# Patient Record
Sex: FEMALE | Race: WHITE | NOT HISPANIC OR LATINO | Employment: UNEMPLOYED
[De-identification: names, ages, dates, MRNs, and addresses within clinical notes are randomized per-mention and may not be internally consistent; named-entity substitution may affect disease eponyms.]

---

## 2017-02-07 ENCOUNTER — RECORDS - HEALTHEAST (OUTPATIENT)
Dept: ADMINISTRATIVE | Facility: OTHER | Age: 42
End: 2017-02-07

## 2017-02-09 ENCOUNTER — RECORDS - HEALTHEAST (OUTPATIENT)
Dept: ADMINISTRATIVE | Facility: OTHER | Age: 42
End: 2017-02-09

## 2017-04-11 ENCOUNTER — RECORDS - HEALTHEAST (OUTPATIENT)
Dept: ADMINISTRATIVE | Facility: OTHER | Age: 42
End: 2017-04-11

## 2017-05-30 ENCOUNTER — AMBULATORY - RIVER FALLS (OUTPATIENT)
Dept: FAMILY MEDICINE | Facility: CLINIC | Age: 42
End: 2017-05-30
Payer: COMMERCIAL

## 2017-06-01 ENCOUNTER — RECORDS - HEALTHEAST (OUTPATIENT)
Dept: ADMINISTRATIVE | Facility: OTHER | Age: 42
End: 2017-06-01

## 2017-07-25 ENCOUNTER — RECORDS - HEALTHEAST (OUTPATIENT)
Dept: ADMINISTRATIVE | Facility: OTHER | Age: 42
End: 2017-07-25

## 2017-07-27 ENCOUNTER — RECORDS - HEALTHEAST (OUTPATIENT)
Dept: ADMINISTRATIVE | Facility: OTHER | Age: 42
End: 2017-07-27

## 2018-06-07 ENCOUNTER — COMMUNICATION - HEALTHEAST (OUTPATIENT)
Dept: SCHEDULING | Facility: CLINIC | Age: 43
End: 2018-06-07

## 2018-06-07 ENCOUNTER — OFFICE VISIT - HEALTHEAST (OUTPATIENT)
Dept: FAMILY MEDICINE | Facility: CLINIC | Age: 43
End: 2018-06-07

## 2018-06-07 DIAGNOSIS — G47.411 NARCOLEPSY WITH CATAPLEXY: ICD-10-CM

## 2018-06-07 DIAGNOSIS — G43.909 MIGRAINE: ICD-10-CM

## 2018-06-07 DIAGNOSIS — I10 ESSENTIAL HYPERTENSION: ICD-10-CM

## 2018-06-07 DIAGNOSIS — E87.6 HYPOKALEMIA: ICD-10-CM

## 2018-06-07 DIAGNOSIS — G47.10 HYPERSOMNIA: ICD-10-CM

## 2018-06-07 DIAGNOSIS — Z79.899 CONTROLLED SUBSTANCE AGREEMENT SIGNED: ICD-10-CM

## 2018-06-07 LAB
ANION GAP SERPL CALCULATED.3IONS-SCNC: 15 MMOL/L (ref 5–18)
BUN SERPL-MCNC: 13 MG/DL (ref 8–22)
CALCIUM SERPL-MCNC: 10.5 MG/DL (ref 8.5–10.5)
CHLORIDE BLD-SCNC: 100 MMOL/L (ref 98–107)
CO2 SERPL-SCNC: 21 MMOL/L (ref 22–31)
CREAT SERPL-MCNC: 1.01 MG/DL (ref 0.6–1.1)
GFR SERPL CREATININE-BSD FRML MDRD: 60 ML/MIN/1.73M2
GLUCOSE BLD-MCNC: 109 MG/DL (ref 70–125)
POTASSIUM BLD-SCNC: 3.5 MMOL/L (ref 3.5–5)
SODIUM SERPL-SCNC: 136 MMOL/L (ref 136–145)

## 2018-06-07 ASSESSMENT — MIFFLIN-ST. JEOR: SCORE: 1377.22

## 2018-06-15 ENCOUNTER — RECORDS - HEALTHEAST (OUTPATIENT)
Dept: ADMINISTRATIVE | Facility: OTHER | Age: 43
End: 2018-06-15

## 2018-06-15 ENCOUNTER — COMMUNICATION - HEALTHEAST (OUTPATIENT)
Dept: FAMILY MEDICINE | Facility: CLINIC | Age: 43
End: 2018-06-15

## 2018-07-06 ENCOUNTER — OFFICE VISIT - HEALTHEAST (OUTPATIENT)
Dept: SLEEP MEDICINE | Facility: CLINIC | Age: 43
End: 2018-07-06

## 2018-07-06 DIAGNOSIS — G47.10 HYPERSOMNIA: ICD-10-CM

## 2018-07-06 DIAGNOSIS — G47.411 NARCOLEPSY WITH CATAPLEXY: ICD-10-CM

## 2018-07-06 ASSESSMENT — MIFFLIN-ST. JEOR: SCORE: 1412.66

## 2018-07-17 ENCOUNTER — COMMUNICATION - HEALTHEAST (OUTPATIENT)
Dept: SLEEP MEDICINE | Facility: CLINIC | Age: 43
End: 2018-07-17

## 2018-07-25 ENCOUNTER — COMMUNICATION - HEALTHEAST (OUTPATIENT)
Dept: SLEEP MEDICINE | Facility: CLINIC | Age: 43
End: 2018-07-25

## 2018-07-25 DIAGNOSIS — G47.411 NARCOLEPSY WITH CATAPLEXY: ICD-10-CM

## 2018-07-26 ENCOUNTER — OFFICE VISIT - HEALTHEAST (OUTPATIENT)
Dept: FAMILY MEDICINE | Facility: CLINIC | Age: 43
End: 2018-07-26

## 2018-07-26 DIAGNOSIS — I10 ESSENTIAL HYPERTENSION: ICD-10-CM

## 2018-07-26 DIAGNOSIS — R51.9 FREQUENT HEADACHES: ICD-10-CM

## 2018-07-26 DIAGNOSIS — J20.9 ACUTE BRONCHITIS: ICD-10-CM

## 2018-07-26 DIAGNOSIS — G47.411 NARCOLEPSY WITH CATAPLEXY: ICD-10-CM

## 2018-07-26 LAB — FERRITIN SERPL-MCNC: 16 NG/ML (ref 10–130)

## 2018-07-26 ASSESSMENT — MIFFLIN-ST. JEOR: SCORE: 1405.29

## 2018-08-07 ENCOUNTER — COMMUNICATION - HEALTHEAST (OUTPATIENT)
Dept: SLEEP MEDICINE | Facility: CLINIC | Age: 43
End: 2018-08-07

## 2018-08-08 ENCOUNTER — COMMUNICATION - HEALTHEAST (OUTPATIENT)
Dept: SLEEP MEDICINE | Facility: CLINIC | Age: 43
End: 2018-08-08

## 2018-08-08 DIAGNOSIS — G47.10 HYPERSOMNIA: ICD-10-CM

## 2018-08-08 DIAGNOSIS — G47.411 NARCOLEPSY WITH CATAPLEXY: ICD-10-CM

## 2018-09-06 ENCOUNTER — COMMUNICATION - HEALTHEAST (OUTPATIENT)
Dept: FAMILY MEDICINE | Facility: CLINIC | Age: 43
End: 2018-09-06

## 2018-09-06 DIAGNOSIS — G47.411 NARCOLEPSY WITH CATAPLEXY: ICD-10-CM

## 2018-09-06 DIAGNOSIS — G47.10 HYPERSOMNIA: ICD-10-CM

## 2018-09-10 ENCOUNTER — COMMUNICATION - HEALTHEAST (OUTPATIENT)
Dept: SLEEP MEDICINE | Facility: CLINIC | Age: 43
End: 2018-09-10

## 2018-09-10 DIAGNOSIS — G47.411 NARCOLEPSY WITH CATAPLEXY: ICD-10-CM

## 2018-09-10 DIAGNOSIS — G47.10 HYPERSOMNIA: ICD-10-CM

## 2018-09-17 ENCOUNTER — COMMUNICATION - HEALTHEAST (OUTPATIENT)
Dept: SLEEP MEDICINE | Facility: CLINIC | Age: 43
End: 2018-09-17

## 2018-09-21 ENCOUNTER — COMMUNICATION - HEALTHEAST (OUTPATIENT)
Dept: SLEEP MEDICINE | Facility: CLINIC | Age: 43
End: 2018-09-21

## 2018-10-03 ENCOUNTER — OFFICE VISIT - HEALTHEAST (OUTPATIENT)
Dept: FAMILY MEDICINE | Facility: CLINIC | Age: 43
End: 2018-10-03

## 2018-10-03 DIAGNOSIS — J06.9 URI (UPPER RESPIRATORY INFECTION): ICD-10-CM

## 2018-10-03 ASSESSMENT — MIFFLIN-ST. JEOR: SCORE: 1346.89

## 2018-10-11 ENCOUNTER — AMBULATORY - HEALTHEAST (OUTPATIENT)
Dept: SLEEP MEDICINE | Facility: CLINIC | Age: 43
End: 2018-10-11

## 2018-11-15 ENCOUNTER — COMMUNICATION - HEALTHEAST (OUTPATIENT)
Dept: SLEEP MEDICINE | Facility: CLINIC | Age: 43
End: 2018-11-15

## 2018-11-18 ENCOUNTER — COMMUNICATION - HEALTHEAST (OUTPATIENT)
Dept: SLEEP MEDICINE | Facility: CLINIC | Age: 43
End: 2018-11-18

## 2018-11-18 DIAGNOSIS — G47.411 NARCOLEPSY WITH CATAPLEXY: ICD-10-CM

## 2018-11-18 DIAGNOSIS — G47.10 HYPERSOMNIA: ICD-10-CM

## 2018-11-21 ENCOUNTER — COMMUNICATION - HEALTHEAST (OUTPATIENT)
Dept: SLEEP MEDICINE | Facility: CLINIC | Age: 43
End: 2018-11-21

## 2018-12-13 ENCOUNTER — COMMUNICATION - HEALTHEAST (OUTPATIENT)
Dept: SLEEP MEDICINE | Facility: CLINIC | Age: 43
End: 2018-12-13

## 2018-12-13 DIAGNOSIS — G47.10 HYPERSOMNIA: ICD-10-CM

## 2018-12-13 DIAGNOSIS — G47.411 NARCOLEPSY WITH CATAPLEXY: ICD-10-CM

## 2018-12-18 ENCOUNTER — COMMUNICATION - HEALTHEAST (OUTPATIENT)
Dept: SLEEP MEDICINE | Facility: CLINIC | Age: 43
End: 2018-12-18

## 2018-12-18 DIAGNOSIS — G47.411 NARCOLEPSY WITH CATAPLEXY: ICD-10-CM

## 2019-01-17 ENCOUNTER — COMMUNICATION - HEALTHEAST (OUTPATIENT)
Dept: SLEEP MEDICINE | Facility: CLINIC | Age: 44
End: 2019-01-17

## 2019-01-17 DIAGNOSIS — G47.411 NARCOLEPSY WITH CATAPLEXY: ICD-10-CM

## 2019-01-17 DIAGNOSIS — G47.10 HYPERSOMNIA: ICD-10-CM

## 2019-01-21 ENCOUNTER — COMMUNICATION - HEALTHEAST (OUTPATIENT)
Dept: SLEEP MEDICINE | Facility: CLINIC | Age: 44
End: 2019-01-21

## 2019-01-23 ENCOUNTER — COMMUNICATION - HEALTHEAST (OUTPATIENT)
Dept: SLEEP MEDICINE | Facility: CLINIC | Age: 44
End: 2019-01-23

## 2019-01-25 ENCOUNTER — OFFICE VISIT - HEALTHEAST (OUTPATIENT)
Dept: SLEEP MEDICINE | Facility: CLINIC | Age: 44
End: 2019-01-25

## 2019-01-25 DIAGNOSIS — G47.419 NARCOLEPSY WITHOUT CATAPLEXY: ICD-10-CM

## 2019-01-25 ASSESSMENT — MIFFLIN-ST. JEOR: SCORE: 1418.33

## 2019-01-30 ENCOUNTER — COMMUNICATION - HEALTHEAST (OUTPATIENT)
Dept: SLEEP MEDICINE | Facility: CLINIC | Age: 44
End: 2019-01-30

## 2019-01-31 ENCOUNTER — COMMUNICATION - HEALTHEAST (OUTPATIENT)
Dept: SLEEP MEDICINE | Facility: CLINIC | Age: 44
End: 2019-01-31

## 2019-02-06 ENCOUNTER — COMMUNICATION - HEALTHEAST (OUTPATIENT)
Dept: SLEEP MEDICINE | Facility: CLINIC | Age: 44
End: 2019-02-06

## 2019-02-15 ENCOUNTER — OFFICE VISIT - HEALTHEAST (OUTPATIENT)
Dept: SLEEP MEDICINE | Facility: CLINIC | Age: 44
End: 2019-02-15

## 2019-02-15 DIAGNOSIS — G47.419 NARCOLEPSY WITHOUT CATAPLEXY: ICD-10-CM

## 2019-02-15 ASSESSMENT — MIFFLIN-ST. JEOR: SCORE: 1425.14

## 2019-02-19 ENCOUNTER — OFFICE VISIT (OUTPATIENT)
Dept: SLEEP MEDICINE | Facility: CLINIC | Age: 44
End: 2019-02-19
Payer: COMMERCIAL

## 2019-02-19 VITALS
BODY MASS INDEX: 26.53 KG/M2 | SYSTOLIC BLOOD PRESSURE: 127 MMHG | HEART RATE: 64 BPM | DIASTOLIC BLOOD PRESSURE: 84 MMHG | WEIGHT: 169 LBS | HEIGHT: 67 IN

## 2019-02-19 DIAGNOSIS — G47.411 NARCOLEPSY WITH CATAPLEXY: Primary | ICD-10-CM

## 2019-02-19 DIAGNOSIS — G47.411 NARCOLEPSY AND CATAPLEXY: Primary | ICD-10-CM

## 2019-02-19 LAB — HCG UR QL: NEGATIVE

## 2019-02-19 PROCEDURE — 81025 URINE PREGNANCY TEST: CPT | Performed by: INTERNAL MEDICINE

## 2019-02-19 RX ORDER — ACETAMINOPHEN 500 MG
500-1000 TABLET ORAL
COMMUNITY
Start: 2018-09-26 | End: 2020-04-07

## 2019-02-19 RX ORDER — LISINOPRIL AND HYDROCHLOROTHIAZIDE 20; 25 MG/1; MG/1
1 TABLET ORAL
COMMUNITY
Start: 2018-07-26 | End: 2020-11-06

## 2019-02-19 RX ORDER — DEXTROAMPHETAMINE SULFATE, DEXTROAMPHETAMINE SACCHARATE, AMPHETAMINE SULFATE AND AMPHETAMINE ASPARTATE 7.5; 7.5; 7.5; 7.5 MG/1; MG/1; MG/1; MG/1
CAPSULE, EXTENDED RELEASE ORAL
Refills: 0 | COMMUNITY
Start: 2019-01-28 | End: 2019-12-17

## 2019-02-19 RX ORDER — DEXTROAMPHETAMINE SACCHARATE, AMPHETAMINE ASPARTATE MONOHYDRATE, DEXTROAMPHETAMINE SULFATE AND AMPHETAMINE SULFATE 7.5; 7.5; 7.5; 7.5 MG/1; MG/1; MG/1; MG/1
30 CAPSULE, EXTENDED RELEASE ORAL
COMMUNITY
Start: 2016-11-16 | End: 2019-12-17

## 2019-02-19 RX ORDER — POTASSIUM CHLORIDE 1500 MG/1
20 TABLET, EXTENDED RELEASE ORAL
COMMUNITY
Start: 2018-12-21 | End: 2020-11-06

## 2019-02-19 RX ORDER — AMLODIPINE BESYLATE 5 MG/1
5 TABLET ORAL
COMMUNITY
Start: 2018-01-11 | End: 2020-04-07

## 2019-02-19 ASSESSMENT — MIFFLIN-ST. JEOR: SCORE: 1444.21

## 2019-02-19 NOTE — PROGRESS NOTES
"Patient of mine from [facility] [place]:  Dx with Narcolepsy with cataplexy in 2004. I was able to find a copy of her initial PSG and MSLT. Her initial PSG was completed on 6/14/04 and it showed a normal AHI (no BLAKE). Sh completed an MSLT the following day (6/15/04) and this showed a mean atency of 6.4 minutes and 4 SOREMs  Likely some component of sleep deprivation.    Visit to review entrance into Pitolisant study.    No charge visit.  Reviewed consent and signed.    /84   Pulse 64   Ht 1.702 m (5' 7\")   Wt 76.7 kg (169 lb)   BMI 26.47 kg/m    170.2 cm  76 kg     Urine preg qual (s/p tubal but required by study).  "

## 2019-02-19 NOTE — NURSING NOTE
"Chief Complaint   Patient presents with     Sleep Problem     Drug trial intake        Initial /84   Pulse 64   Ht 1.702 m (5' 7\")   Wt 76.7 kg (169 lb)   BMI 26.47 kg/m   Estimated body mass index is 26.47 kg/m  as calculated from the following:    Height as of this encounter: 1.702 m (5' 7\").    Weight as of this encounter: 76.7 kg (169 lb).    Medication Reconciliation: complete    Neck circumference: 12 inches / 31 centimeters.        "

## 2019-02-22 ENCOUNTER — COMMUNICATION - HEALTHEAST (OUTPATIENT)
Dept: SLEEP MEDICINE | Facility: CLINIC | Age: 44
End: 2019-02-22

## 2019-02-22 DIAGNOSIS — G47.10 HYPERSOMNIA: ICD-10-CM

## 2019-02-22 DIAGNOSIS — G47.419 NARCOLEPSY WITHOUT CATAPLEXY: ICD-10-CM

## 2019-02-22 DIAGNOSIS — G47.411 NARCOLEPSY WITH CATAPLEXY: ICD-10-CM

## 2019-03-21 ENCOUNTER — COMMUNICATION - HEALTHEAST (OUTPATIENT)
Dept: SLEEP MEDICINE | Facility: CLINIC | Age: 44
End: 2019-03-21

## 2019-03-21 DIAGNOSIS — G47.10 HYPERSOMNIA: ICD-10-CM

## 2019-03-21 DIAGNOSIS — G47.411 NARCOLEPSY WITH CATAPLEXY: ICD-10-CM

## 2019-03-25 ENCOUNTER — COMMUNICATION - HEALTHEAST (OUTPATIENT)
Dept: SLEEP MEDICINE | Facility: CLINIC | Age: 44
End: 2019-03-25

## 2019-03-25 DIAGNOSIS — G47.10 HYPERSOMNIA: ICD-10-CM

## 2019-03-25 DIAGNOSIS — G47.411 NARCOLEPSY WITH CATAPLEXY: ICD-10-CM

## 2019-04-22 ENCOUNTER — COMMUNICATION - HEALTHEAST (OUTPATIENT)
Dept: SLEEP MEDICINE | Facility: CLINIC | Age: 44
End: 2019-04-22

## 2019-04-22 DIAGNOSIS — G47.411 NARCOLEPSY WITH CATAPLEXY: ICD-10-CM

## 2019-04-22 DIAGNOSIS — G47.10 HYPERSOMNIA: ICD-10-CM

## 2019-04-23 ENCOUNTER — OFFICE VISIT (OUTPATIENT)
Dept: SLEEP MEDICINE | Facility: CLINIC | Age: 44
End: 2019-04-23
Payer: COMMERCIAL

## 2019-04-23 VITALS
OXYGEN SATURATION: 100 % | HEIGHT: 67 IN | HEART RATE: 84 BPM | DIASTOLIC BLOOD PRESSURE: 103 MMHG | BODY MASS INDEX: 27.62 KG/M2 | SYSTOLIC BLOOD PRESSURE: 148 MMHG | WEIGHT: 176 LBS

## 2019-04-23 DIAGNOSIS — G47.411 NARCOLEPSY AND CATAPLEXY: Primary | ICD-10-CM

## 2019-04-23 DIAGNOSIS — I10 ESSENTIAL HYPERTENSION: ICD-10-CM

## 2019-04-23 PROCEDURE — 99213 OFFICE O/P EST LOW 20 MIN: CPT | Performed by: INTERNAL MEDICINE

## 2019-04-23 ASSESSMENT — MIFFLIN-ST. JEOR: SCORE: 1475.96

## 2019-04-23 NOTE — PATIENT INSTRUCTIONS
Your BMI is Body mass index is 27.57 kg/m .  Weight management is a personal decision.  If you are interested in exploring weight loss strategies, the following discussion covers the approaches that may be successful. Body mass index (BMI) is one way to tell whether you are at a healthy weight, overweight, or obese. It measures your weight in relation to your height.  A BMI of 18.5 to 24.9 is in the healthy range. A person with a BMI of 25 to 29.9 is considered overweight, and someone with a BMI of 30 or greater is considered obese. More than two-thirds of American adults are considered overweight or obese.  Being overweight or obese increases the risk for further weight gain. Excess weight may lead to heart disease and diabetes.  Creating and following plans for healthy eating and physical activity may help you improve your health.  Weight control is part of healthy lifestyle and includes exercise, emotional health, and healthy eating habits. Careful eating habits lifelong are the mainstay of weight control. Though there are significant health benefits from weight loss, long-term weight loss with diet alone may be very difficult to achieve- studies show long-term success with dietary management in less than 10% of people. Attaining a healthy weight may be especially difficult to achieve in those with severe obesity. In some cases, medications, devices and surgical management might be considered.  What can you do?  If you are overweight or obese and are interested in methods for weight loss, you should discuss this with your provider.     Consider reducing daily calorie intake by 500 calories.     Keep a food journal.     Avoiding skipping meals, consider cutting portions instead.    Diet combined with exercise helps maintain muscle while optimizing fat loss. Strength training is particularly important for building and maintaining muscle mass. Exercise helps reduce stress, increase energy, and improves fitness.  Increasing exercise without diet control, however, may not burn enough calories to loose weight.       Start walking three days a week 10-20 minutes at a time    Work towards walking thirty minutes five days a week     Eventually, increase the speed of your walking for 1-2 minutes at time    In addition, we recommend that you review healthy lifestyles and methods for weight loss available through the National Institutes of Health patient information sites:  http://win.niddk.nih.gov/publications/index.htm    And look into health and wellness programs that may be available through your health insurance provider, employer, local community center, or keara club.    Weight management plan: Patient was referred to their PCP to discuss a diet and exercise plan.

## 2019-04-23 NOTE — NURSING NOTE
"Chief Complaint   Patient presents with     Sleep Problem     drug trial f/u       Initial BP (!) 148/103   Pulse 84   Ht 1.702 m (5' 7\")   Wt 79.8 kg (176 lb)   SpO2 100%   BMI 27.57 kg/m   Estimated body mass index is 27.57 kg/m  as calculated from the following:    Height as of this encounter: 1.702 m (5' 7\").    Weight as of this encounter: 79.8 kg (176 lb).    Medication Reconciliation: complete      "

## 2019-04-23 NOTE — PROGRESS NOTES
"Medication Follow-Up Visit:    Chief Complaint   Patient presents with     Sleep Problem     drug trial f/u       Maddi Fam comes in today for follow-up of narcolepsy type I     No specialty comments available.    Overall, the patient reports they are doing ok. Has good and bad days.  Has been on Pitolisant for about 8 weeks and doesn't feel she's noticed much of a difference.    Has been having slight elevations in blood pressure and ran out of amlodipine 3 days ago.    Appetite has been slightly higher and has gained 7 lbs.     Patient is not having medication side effects.   Lately has been working a little earlier - was starting at 6 AM and working 10 hour days, plus working 4 hours on Saturday.    Total score - [place]: 13 (4/23/2019  2:00 PM)  LAZARUS Total Score: 13    Past medical/surgical history, family history, social history, medications and allergies were reviewed.      Problem List:  Patient Active Problem List    Diagnosis Date Noted     Narcolepsy and cataplexy 02/19/2019     Priority: Medium     Dx with Narcolepsy with cataplexy in 2004. I was able to find a copy of her initial PSG and MSLT. Her initial PSG was completed on 6/14/04 and it showed a normal AHI (no BLAKE). Sh completed an MSLT the following day (6/15/04) and this showed a mean atency of 6.4 minutes and 4 SOREMs  Likely some component of sleep deprivation.          BP (!) 148/103   Pulse 84   Ht 1.702 m (5' 7\")   Wt 79.8 kg (176 lb)   SpO2 100%   BMI 27.57 kg/m      Impression/Plan:  1. Narcolepsy and cataplexy  Has not noticed much of a difference on the Pitolisant so far.  Concerned about weight gain but not sure that is related to the medication.  Will try for another 1 - 2 months then can consider switching to Xyrem after tapering off Pitolisant if she doesn't notice much of a difference.    2. Essential hypertension  Patient was counseled on the effects of untreated/sub-optimally treated hypertension.  She was told to discuss " findings with her PCP.   Maddi to follow up with Primary Care provider regarding elevated blood pressure.     Maddi Fam will follow up in about 2 month(s).     Fifteen minutes spent with patient, all of which were spent face-to-face counseling, consulting, coordinating plan of care.      CC:  No primary care provider on file.,

## 2019-05-14 ENCOUNTER — COMMUNICATION - HEALTHEAST (OUTPATIENT)
Dept: FAMILY MEDICINE | Facility: CLINIC | Age: 44
End: 2019-05-14

## 2019-05-20 ENCOUNTER — COMMUNICATION - HEALTHEAST (OUTPATIENT)
Dept: SLEEP MEDICINE | Facility: CLINIC | Age: 44
End: 2019-05-20

## 2019-05-20 DIAGNOSIS — G47.10 HYPERSOMNIA: ICD-10-CM

## 2019-05-20 DIAGNOSIS — G47.411 NARCOLEPSY WITH CATAPLEXY: ICD-10-CM

## 2019-06-20 ENCOUNTER — COMMUNICATION - HEALTHEAST (OUTPATIENT)
Dept: SLEEP MEDICINE | Facility: CLINIC | Age: 44
End: 2019-06-20

## 2019-06-20 DIAGNOSIS — G47.10 HYPERSOMNIA: ICD-10-CM

## 2019-06-20 DIAGNOSIS — G47.411 NARCOLEPSY WITH CATAPLEXY: ICD-10-CM

## 2019-07-19 ENCOUNTER — COMMUNICATION - HEALTHEAST (OUTPATIENT)
Dept: SLEEP MEDICINE | Facility: CLINIC | Age: 44
End: 2019-07-19

## 2019-07-19 DIAGNOSIS — G47.411 NARCOLEPSY WITH CATAPLEXY: ICD-10-CM

## 2019-07-19 DIAGNOSIS — G47.10 HYPERSOMNIA: ICD-10-CM

## 2019-08-20 ENCOUNTER — COMMUNICATION - HEALTHEAST (OUTPATIENT)
Dept: SLEEP MEDICINE | Facility: CLINIC | Age: 44
End: 2019-08-20

## 2019-08-20 DIAGNOSIS — G47.10 HYPERSOMNIA: ICD-10-CM

## 2019-08-20 DIAGNOSIS — G47.411 NARCOLEPSY WITH CATAPLEXY: ICD-10-CM

## 2019-09-17 ENCOUNTER — COMMUNICATION - HEALTHEAST (OUTPATIENT)
Dept: SLEEP MEDICINE | Facility: CLINIC | Age: 44
End: 2019-09-17

## 2019-09-17 DIAGNOSIS — G47.10 HYPERSOMNIA: ICD-10-CM

## 2019-09-17 DIAGNOSIS — G47.411 NARCOLEPSY WITH CATAPLEXY: ICD-10-CM

## 2019-10-17 ENCOUNTER — COMMUNICATION - HEALTHEAST (OUTPATIENT)
Dept: SLEEP MEDICINE | Facility: CLINIC | Age: 44
End: 2019-10-17

## 2019-10-29 ENCOUNTER — COMMUNICATION - HEALTHEAST (OUTPATIENT)
Dept: SLEEP MEDICINE | Facility: CLINIC | Age: 44
End: 2019-10-29

## 2019-12-20 DIAGNOSIS — G47.411 NARCOLEPSY AND CATAPLEXY: ICD-10-CM

## 2019-12-20 RX ORDER — DEXTROAMPHETAMINE SULFATE, DEXTROAMPHETAMINE SACCHARATE, AMPHETAMINE SULFATE AND AMPHETAMINE ASPARTATE 7.5; 7.5; 7.5; 7.5 MG/1; MG/1; MG/1; MG/1
30 CAPSULE, EXTENDED RELEASE ORAL 2 TIMES DAILY
Qty: 60 CAPSULE | Refills: 0 | Status: SHIPPED | OUTPATIENT
Start: 2019-12-20 | End: 2020-01-16

## 2019-12-20 NOTE — TELEPHONE ENCOUNTER
She states that only 1/2 the 30 days was filled if she takes med's twice daily. She is also requesting refills for both medications  til she is Seen by Dr. Melgoza 2/10/20.        ADDERALL XR 30 MG 24 hr capsule  Last Written Prescription Date:  12/17/19  Last Fill Quantity: 30,   # refills: 0  Last Office Visit: 6/23/19  Future Office visit: 2/10/20      Routing refill request to provider for review/approval because:  Drug not on the [facility], P or [facility] refill protocol or controlled substance

## 2020-01-13 ENCOUNTER — TELEPHONE (OUTPATIENT)
Dept: SLEEP MEDICINE | Facility: CLINIC | Age: 45
End: 2020-01-13

## 2020-01-16 DIAGNOSIS — G47.411 NARCOLEPSY AND CATAPLEXY: ICD-10-CM

## 2020-01-16 RX ORDER — DEXTROAMPHETAMINE SULFATE, DEXTROAMPHETAMINE SACCHARATE, AMPHETAMINE SULFATE AND AMPHETAMINE ASPARTATE 7.5; 7.5; 7.5; 7.5 MG/1; MG/1; MG/1; MG/1
30 CAPSULE, EXTENDED RELEASE ORAL 2 TIMES DAILY
Qty: 60 CAPSULE | Refills: 0 | Status: SHIPPED | OUTPATIENT
Start: 2020-01-19 | End: 2020-02-20

## 2020-01-16 RX ORDER — DEXTROAMPHETAMINE SACCHARATE, AMPHETAMINE ASPARTATE, DEXTROAMPHETAMINE SULFATE AND AMPHETAMINE SULFATE 7.5; 7.5; 7.5; 7.5 MG/1; MG/1; MG/1; MG/1
30 TABLET ORAL 2 TIMES DAILY
Qty: 60 TABLET | Refills: 0 | Status: SHIPPED | OUTPATIENT
Start: 2020-01-19 | End: 2020-02-20

## 2020-01-16 NOTE — TELEPHONE ENCOUNTER
amphetamine-dextroamphetamine (ADDERALL) 30 MG tablet      Last Written Prescription Date:  12/17/19  Last Fill Quantity: 60,   # refills: 0  Last Office Visit: 4/23/19  Future Office visit: appt with Svee   2/20/20    Routing refill request to provider for review/approval because:  Drug not on the [facility], [facility] or [facility] refill protocol or controlled substance        ADDERALL XR 30 MG 24 hr capsule  Last Written Prescription Date:  12/17/19  Last Fill Quantity: 60,   # refills: 0  Last Office Visit: 4/23/19  Future Office visit:   appt with Svee   2/20/20    Routing refill request to provider for review/approval because:  Drug not on the [facility], [facility] or [facility] refill protocol or controlled substance

## 2020-02-19 PROBLEM — I10 ESSENTIAL HYPERTENSION: Chronic | Status: ACTIVE | Noted: 2017-10-13

## 2020-02-19 PROBLEM — F17.200 TOBACCO USE DISORDER: Status: ACTIVE | Noted: 2020-02-19

## 2020-02-20 ENCOUNTER — TELEPHONE (OUTPATIENT)
Dept: SLEEP MEDICINE | Facility: CLINIC | Age: 45
End: 2020-02-20

## 2020-02-20 DIAGNOSIS — N20.0 KIDNEY STONE: ICD-10-CM

## 2020-02-20 RX ORDER — DEXTROAMPHETAMINE SACCHARATE, AMPHETAMINE ASPARTATE, DEXTROAMPHETAMINE SULFATE AND AMPHETAMINE SULFATE 7.5; 7.5; 7.5; 7.5 MG/1; MG/1; MG/1; MG/1
30 TABLET ORAL 2 TIMES DAILY
Qty: 60 TABLET | Refills: 0 | Status: SHIPPED | OUTPATIENT
Start: 2020-02-20 | End: 2020-04-07

## 2020-02-20 RX ORDER — DEXTROAMPHETAMINE SULFATE, DEXTROAMPHETAMINE SACCHARATE, AMPHETAMINE SULFATE AND AMPHETAMINE ASPARTATE 7.5; 7.5; 7.5; 7.5 MG/1; MG/1; MG/1; MG/1
30 CAPSULE, EXTENDED RELEASE ORAL 2 TIMES DAILY
Qty: 60 CAPSULE | Refills: 0 | Status: SHIPPED | OUTPATIENT
Start: 2020-02-20 | End: 2020-03-20

## 2020-02-20 NOTE — TELEPHONE ENCOUNTER
Patient is suffering with kidney stones and she got out of the hospital today and her doctor wants to try and pass it at home. She's in a lot of pain, we had to reschedule her appointment for 04/2020, but I will call her if something does open up sooner. She wanted Dr. Melgoza to know the situation and is asking and hoping he will fill the prescription. She is scheduled for a follow-up in 04/2020.      Pallavi Koenig

## 2020-03-19 DIAGNOSIS — G47.411 NARCOLEPSY AND CATAPLEXY: ICD-10-CM

## 2020-03-19 NOTE — TELEPHONE ENCOUNTER
Adderall XR 30 mg    Last Written Prescription Date:  2/20/20  Last Fill Quantity: 60,   # refills: 0  Last Office Visit: 4/23/19  Future Office visit: 4/8/2020      Routing refill request to provider for review/approval because:  Drug not on the [facility], [facility] or [facility] refill protocol or controlled substance

## 2020-03-19 NOTE — TELEPHONE ENCOUNTER
Reason for call:  Other   Patient called regarding (reason for call): prescription  Additional comments: Patient is requesting refill of Adderall prescriptions prior to appt on 4/8. Send to pharmacy on file.     Phone number to reach patient:  Home number on file [phone] (home)    Best Time:  Any time    Can we leave a detailed message on this number?  YES    Travel screening: Not Applicable

## 2020-03-20 RX ORDER — DEXTROAMPHETAMINE SULFATE, DEXTROAMPHETAMINE SACCHARATE, AMPHETAMINE SULFATE AND AMPHETAMINE ASPARTATE 7.5; 7.5; 7.5; 7.5 MG/1; MG/1; MG/1; MG/1
30 CAPSULE, EXTENDED RELEASE ORAL 2 TIMES DAILY
Qty: 60 CAPSULE | Refills: 0 | Status: SHIPPED | OUTPATIENT
Start: 2020-03-20 | End: 2020-04-08

## 2020-03-24 ENCOUNTER — COMMUNICATION - HEALTHEAST (OUTPATIENT)
Dept: SLEEP MEDICINE | Facility: CLINIC | Age: 45
End: 2020-03-24

## 2020-03-24 DIAGNOSIS — G47.10 HYPERSOMNIA: ICD-10-CM

## 2020-03-24 DIAGNOSIS — G47.411 NARCOLEPSY WITH CATAPLEXY: ICD-10-CM

## 2020-04-07 ENCOUNTER — TELEPHONE (OUTPATIENT)
Dept: SLEEP MEDICINE | Facility: CLINIC | Age: 45
End: 2020-04-07

## 2020-04-07 VITALS — HEIGHT: 67 IN | BODY MASS INDEX: 27.47 KG/M2 | WEIGHT: 175 LBS

## 2020-04-07 DIAGNOSIS — G47.411 NARCOLEPSY AND CATAPLEXY: ICD-10-CM

## 2020-04-07 RX ORDER — SUMATRIPTAN 100 MG/1
100 TABLET, FILM COATED ORAL
COMMUNITY
Start: 2016-05-25

## 2020-04-07 RX ORDER — ACETAMINOPHEN 500 MG
500-1000 TABLET ORAL
COMMUNITY
Start: 2018-09-26 | End: 2022-02-09

## 2020-04-07 RX ORDER — NAPROXEN 500 MG/1
TABLET ORAL
COMMUNITY
Start: 2020-02-24

## 2020-04-07 RX ORDER — POTASSIUM CHLORIDE 20MEQ/15ML
LIQUID (ML) ORAL
COMMUNITY
Start: 2020-02-10

## 2020-04-07 RX ORDER — CEPHALEXIN 250 MG
CAPSULE ORAL
COMMUNITY
End: 2022-02-09

## 2020-04-07 RX ORDER — METHOCARBAMOL 500 MG/1
TABLET, FILM COATED ORAL
COMMUNITY
Start: 2020-01-13 | End: 2020-11-06

## 2020-04-07 RX ORDER — LOSARTAN POTASSIUM 50 MG/1
50 TABLET ORAL
COMMUNITY
End: 2022-02-09 | Stop reason: DRUGHIGH

## 2020-04-07 ASSESSMENT — MIFFLIN-ST. JEOR: SCORE: 1466.42

## 2020-04-07 NOTE — NURSING NOTE
"Maddi Fam is a 45 year old female who is being evaluated via a billable telephone visit.      The patient has been notified of following:     \"This telephone visit will be conducted via a call between you and your physician/provider. We have found that certain health care needs can be provided without the need for a physical exam.  This service lets us provide the care you need with a short phone conversation.  If a prescription is necessary we can send it directly to your pharmacy.  If lab work is needed we can place an order for that and you can then stop by our lab to have the test done at a later time.    Telephone visits are billed at different rates depending on your insurance coverage. During this emergency period, for some insurers they may be billed the same as an in-person visit.  Please reach out to your insurance provider with any questions.    If during the course of the call the physician/provider feels a telephone visit is not appropriate, you will not be charged for this service.\"    Patient has given verbal consent for Telephone visit?  Yes    Juliet almazan      "

## 2020-04-07 NOTE — PROGRESS NOTES
Medication Follow-Up Visit:    Chief Complaint   Patient presents with     Medication Follow-up     Clinic visit changed to telephone visit due to covid 19      Maddi aFm comes in today for follow-up of narcolepsy     The patient was diagnosed with narcolepsy in 2004 at [facility]. Her initial PSG was completed in 2002 and did not show any sleep disordered breathing. She represented to Rajat Oswald at [facility] in 2004 and reported her sleepiness improved and the subsequently reoccurred. She has a second sleep study 6/14/04 (145#) that again showed a normal AHI (no BLAKE). She completed an MSLT the following day (6/15/04) and this showed a mean latency of 6.3 minutes and 4 SOREMs in 5 naps    There does not appear to be actigraphy or subjective history regarding total sleep time done in 2004.     Unclear if/where she was treated from [id number]     Patient was seen by Jerson Brown at [facility] 07/06/2018 to establish care for narcolepsy with cataplexy taking Adderall XR 30 mg PO two times a day XR 30 mg short acting two times a day (120 mg per day but on most days but on some days 90 mg). The patient described brief moments of cataplexy that occur in infrequent clusters. She described brief episodes during which she feels that she is going to fall or he head is weak.    Her care was transferred to Dr. Whitman 1/2019 where concerns regarding higher than normal doses of Adderall was expressed. She described her cataplexy as feeling like her medication is not working and her body is shutting down. Then laughter, or emotion or tiredness brings it on. Then she gets feelings of extreme sleepiness and sleep attack. It was felt that these episodes were sleep attacks NOT cataplexy.    Versus testing 1/29/2019  COMT genotype is associated with reduced therapeutic response to this drug: Amphetamine salts, dexmethylphendiate, dextroamphetamine, lisdexamfetamine, methylphenidate    In 2/2019 she entered into Pitolisant  study, but it did not seem effective and was associated with weight gain    No unusual Rx in [place] [facility] database 2/19/2020    Overall, the patient reports they are doing fair.  She gets sleep attack, especially in the morning    During work days bedtime is typically 930.. They are typically getting out of bed at 6.    On non-work days bedtime is typically 1030.  They are typically getting out of bed at 10-11 AM.      She is off work due to Covid-19    She goes back to sleep after getting up and is not functional.   She sometimes gets 'wide awake' when she goes to bed, sometimes this is all night. It has been intermittent for 1 year. She does have problems with an overactive mind.     She takes 30mg IR on awakening, sometimes 2  She takes 30mg XR at 9-10 am  At noon she takes either short or long acting  At 2 Pm she takes whichever one she did not take at noon      Patient is napping now that she is at work.   Patient is using caffeine. 3 sodas a day, last at 3pm  Patient is taking dug holidays with reduced doses, occasionally no doses.   Patient birth control is tubal ligation     Total score - [place]: 11 (4/7/2020  2:57 PM)  LAZARUS Total Score: 20      Past medical/surgical history, family history, social history, medications and allergies were reviewed.      Current Outpatient Medications   Medication Sig Dispense Refill     acetaminophen (TYLENOL) 500 MG tablet Take 500-1,000 mg by mouth       ADDERALL XR 30 MG 24 hr capsule Take 1 capsule (30 mg) by mouth 2 times daily 60 capsule 0     lisinopril-hydrochlorothiazide (PRINZIDE/ZESTORETIC) 20-25 MG tablet Take 1 tablet by mouth       losartan (COZAAR) 50 MG tablet Take 50 mg by mouth       methocarbamol (ROBAXIN) 500 MG tablet        Multiple Vitamins-Minerals (MULTIVITAMIN & MINERAL) LIQD        naproxen (NAPROSYN) 500 MG tablet        potassium chloride (KAYCIEL) 20 MEQ/15ML (10%) solution TK 15 ML PO D       potassium chloride ER (K-DUR/KLOR-CON M) 20 MEQ CR tablet  "Take 20 mEq by mouth       SUMAtriptan (IMITREX) 100 MG tablet Take 100 mg by mouth       topiramate (TOPAMAX) 25 MG tablet Take 50 mg by mouth daily            Problem List:  Patient Active Problem List    Diagnosis Date Noted     Kidney stone 02/20/2020     Priority: Medium     Tobacco use disorder 02/19/2020     Priority: Medium     Narcolepsy and cataplexy 02/19/2019     Priority: Medium     Dx with Narcolepsy with cataplexy in 2004. PSG 6/14/04 and it showed a normal AHI (no BLAKE).  MSLT 6/15/04  showed a mean latency of 6.4 minutes and 4 SOREMs         Essential hypertension 10/13/2017     Priority: Medium     Alcohol abuse 02/19/2014     Priority: Medium     Depression 02/19/2014     Priority: Medium     Migraine without aura 02/27/2008     Priority: Medium     Irritable bowel syndrome 01/24/2004     Priority: Low        Ht 1.702 m (5' 7\")   Wt 79.4 kg (175 lb)   BMI 27.41 kg/m      Impression/Plan:     Narcolepsy without cataplexy  - Refilled  3 months. She reports she never refilled the 3/20/20 XR because she needs it changed to generic. Her IR was filled 3/24/20 and will be due 4/23/20. I will give one Rx for 15 days of XR starting now to align refill dates, and cancel the non-generic Rx.   - Patient needs a face to face visit post-covid for urine drug screen and controlled substance agreement  - She has a refill from Altaf Parikh who does not work at our clinic  - She has irregularly staggered refills  - She wants Generic medications at this time      Sleep onset difficulties  Suspect some Psychophysiologic insomnia, complicated by delayed sleep phase syndrome   We discussed stimulus control   - Consider use of lightbox (10,000 lux, full spectrum or 450-500 nanometers) for 15-20 minutes every morning.  - Consider use of melatonin 1-3 mg, 3-5 hours prior to desired sleep time.     Maddi aFm will follow up in < 6 month(s).     Forty minutes spent with patient     "

## 2020-04-07 NOTE — PATIENT INSTRUCTIONS
Your BMI is Body mass index is 27.41 kg/m .  Weight management is a personal decision.  If you are interested in exploring weight loss strategies, the following discussion covers the approaches that may be successful. Body mass index (BMI) is one way to tell whether you are at a healthy weight, overweight, or obese. It measures your weight in relation to your height.  A BMI of 18.5 to 24.9 is in the healthy range. A person with a BMI of 25 to 29.9 is considered overweight, and someone with a BMI of 30 or greater is considered obese. More than two-thirds of American adults are considered overweight or obese.  Being overweight or obese increases the risk for further weight gain. Excess weight may lead to heart disease and diabetes.  Creating and following plans for healthy eating and physical activity may help you improve your health.  Weight control is part of healthy lifestyle and includes exercise, emotional health, and healthy eating habits. Careful eating habits lifelong are the mainstay of weight control. Though there are significant health benefits from weight loss, long-term weight loss with diet alone may be very difficult to achieve- studies show long-term success with dietary management in less than 10% of people. Attaining a healthy weight may be especially difficult to achieve in those with severe obesity. In some cases, medications, devices and surgical management might be considered.  What can you do?  If you are overweight or obese and are interested in methods for weight loss, you should discuss this with your provider.     Consider reducing daily calorie intake by 500 calories.     Keep a food journal.     Avoiding skipping meals, consider cutting portions instead.    Diet combined with exercise helps maintain muscle while optimizing fat loss. Strength training is particularly important for building and maintaining muscle mass. Exercise helps reduce stress, increase energy, and improves fitness.  Increasing exercise without diet control, however, may not burn enough calories to loose weight.       Start walking three days a week 10-20 minutes at a time    Work towards walking thirty minutes five days a week     Eventually, increase the speed of your walking for 1-2 minutes at time    In addition, we recommend that you review healthy lifestyles and methods for weight loss available through the National Institutes of Health patient information sites:  http://win.niddk.nih.gov/publications/index.htm    And look into health and wellness programs that may be available through your health insurance provider, employer, local community center, or keara club.    Weight management plan: Patient was referred to their PCP to discuss a diet and exercise plan.    - Consider use of lightbox (10,000 lux, full spectrum or 450-500 nanometers) for 15-20 minutes every morning.  - Consider use of melatonin 1-3 mg, 3-5 hours prior to desired sleep time.

## 2020-04-07 NOTE — TELEPHONE ENCOUNTER
PA Initiation    Medication: ADDERALL XR 30 MG 24 hr capsule   Insurance Company: [employer] - Phone [phone] Fax [phone]  Pharmacy Filling the Rx: [facility][id number] - [facility] [address] [facility] AT [address]  Filling Pharmacy Phone: [phone]  Filling Pharmacy Fax: [phone]  Start Date: 4/7/2020

## 2020-04-07 NOTE — TELEPHONE ENCOUNTER
Prior Authorization Retail Medication Request    Medication/Dose: Adderall XR       Pharmacy Information (if different than what is on RX)  Name:  [facility]   Phone:  [phone]       Plan info  [phone]    Pt ID# [id number]

## 2020-04-08 ENCOUNTER — TELEPHONE (OUTPATIENT)
Dept: SLEEP MEDICINE | Facility: CLINIC | Age: 45
End: 2020-04-08

## 2020-04-08 ENCOUNTER — VIRTUAL VISIT (OUTPATIENT)
Dept: SLEEP MEDICINE | Facility: CLINIC | Age: 45
End: 2020-04-08
Payer: COMMERCIAL

## 2020-04-08 DIAGNOSIS — F10.11 HISTORY OF ALCOHOL ABUSE: ICD-10-CM

## 2020-04-08 DIAGNOSIS — G47.419 NARCOLEPSY WITHOUT CATAPLEXY(347.00): ICD-10-CM

## 2020-04-08 PROBLEM — N20.0 KIDNEY STONE: Status: RESOLVED | Noted: 2020-02-20 | Resolved: 2020-04-08

## 2020-04-08 PROCEDURE — 99215 OFFICE O/P EST HI 40 MIN: CPT | Mod: TEL | Performed by: INTERNAL MEDICINE

## 2020-04-08 RX ORDER — DEXTROAMPHETAMINE SACCHARATE, AMPHETAMINE ASPARTATE MONOHYDRATE, DEXTROAMPHETAMINE SULFATE AND AMPHETAMINE SULFATE 7.5; 7.5; 7.5; 7.5 MG/1; MG/1; MG/1; MG/1
30 CAPSULE, EXTENDED RELEASE ORAL 2 TIMES DAILY
Qty: 60 CAPSULE | Refills: 0 | Status: SHIPPED | OUTPATIENT
Start: 2020-04-23 | End: 2020-04-08

## 2020-04-08 RX ORDER — DEXTROAMPHETAMINE SACCHARATE, AMPHETAMINE ASPARTATE, DEXTROAMPHETAMINE SULFATE AND AMPHETAMINE SULFATE 7.5; 7.5; 7.5; 7.5 MG/1; MG/1; MG/1; MG/1
30 TABLET ORAL 2 TIMES DAILY
Qty: 28 TABLET | Refills: 0 | Status: SHIPPED | OUTPATIENT
Start: 2020-04-08 | End: 2020-04-08

## 2020-04-08 RX ORDER — DEXTROAMPHETAMINE SACCHARATE, AMPHETAMINE ASPARTATE MONOHYDRATE, DEXTROAMPHETAMINE SULFATE AND AMPHETAMINE SULFATE 7.5; 7.5; 7.5; 7.5 MG/1; MG/1; MG/1; MG/1
30 CAPSULE, EXTENDED RELEASE ORAL 2 TIMES DAILY
Qty: 60 CAPSULE | Refills: 0 | Status: SHIPPED | OUTPATIENT
Start: 2020-05-23 | End: 2020-07-23

## 2020-04-08 RX ORDER — DEXTROAMPHETAMINE SACCHARATE, AMPHETAMINE ASPARTATE, DEXTROAMPHETAMINE SULFATE AND AMPHETAMINE SULFATE 7.5; 7.5; 7.5; 7.5 MG/1; MG/1; MG/1; MG/1
30 TABLET ORAL 2 TIMES DAILY
Qty: 60 TABLET | Refills: 0 | Status: SHIPPED | OUTPATIENT
Start: 2020-04-23 | End: 2020-04-08

## 2020-04-08 RX ORDER — DEXTROAMPHETAMINE SACCHARATE, AMPHETAMINE ASPARTATE MONOHYDRATE, DEXTROAMPHETAMINE SULFATE AND AMPHETAMINE SULFATE 7.5; 7.5; 7.5; 7.5 MG/1; MG/1; MG/1; MG/1
30 CAPSULE, EXTENDED RELEASE ORAL 2 TIMES DAILY
Qty: 28 CAPSULE | Refills: 0 | Status: SHIPPED | OUTPATIENT
Start: 2020-04-08 | End: 2020-04-08

## 2020-04-08 RX ORDER — DEXTROAMPHETAMINE SACCHARATE, AMPHETAMINE ASPARTATE, DEXTROAMPHETAMINE SULFATE AND AMPHETAMINE SULFATE 7.5; 7.5; 7.5; 7.5 MG/1; MG/1; MG/1; MG/1
30 TABLET ORAL 2 TIMES DAILY
Qty: 60 TABLET | Refills: 0 | Status: SHIPPED | OUTPATIENT
Start: 2020-05-23 | End: 2020-07-23

## 2020-04-08 SDOH — HEALTH STABILITY: MENTAL HEALTH: HOW OFTEN DO YOU HAVE A DRINK CONTAINING ALCOHOL?: 2-4 TIMES A MONTH

## 2020-04-08 NOTE — TELEPHONE ENCOUNTER
PRIOR AUTHORIZATION DENIED    Medication: ADDERALL XR 30 MG 24 hr capsule--DENIED    Denial Date: 4/8/2020    Denial Rational: Patient needs to try 2 preferred alternatives which are generic Adderall XR, methylphenidate, Ritalin LA and generic Metadate.    Appeal Information:

## 2020-04-08 NOTE — TELEPHONE ENCOUNTER
Prior Authorization Not Needed per Insurance    Medication: ADDERALL XR 30 MG 24 hr capsule--NO PA NEEDED  Insurance Company: [employer] - Phone [phone] Fax [phone]  Expected CoPay:      Pharmacy Filling the Rx: [id number] - [facility] [address] [facility] [address] [facility] [address]  Pharmacy Notified:  Yes  Patient Notified:  Yes    Generic doesn't need a prior authorization. Script isn't written for VETO brand name.

## 2020-04-21 ENCOUNTER — TELEPHONE (OUTPATIENT)
Dept: SLEEP MEDICINE | Facility: CLINIC | Age: 45
End: 2020-04-21

## 2020-04-21 ENCOUNTER — COMMUNICATION - HEALTHEAST (OUTPATIENT)
Dept: SLEEP MEDICINE | Facility: CLINIC | Age: 45
End: 2020-04-21

## 2020-04-21 DIAGNOSIS — G47.411 NARCOLEPSY WITH CATAPLEXY: ICD-10-CM

## 2020-04-21 DIAGNOSIS — G47.10 HYPERSOMNIA: ICD-10-CM

## 2020-04-21 NOTE — TELEPHONE ENCOUNTER
"Patient calling to request that Dr. Parikh call [facility] to approve and \"call in\" the early dispensing of the dextroamphetamine-amphetamine 30 mg Tab - [facility] stated to the patient that the Rx is set not to dispense before 4/23/20 - patient has already taken her last available dose and would like to  today     [facility][id number] - [facility] [place] - [address] [facility]     PH: [phone]     *Below is the Rx from [facility]     dextroamphetamine-amphetamine 30 mg Tab  60 tablet  0  3/24/2020   Yes    Sig - Route: Take 30 mg by mouth 2 (two) times a day. - Oral    Sent to pharmacy as: dextroamphetamine-amphetamine 30 mg tablet    Earliest Fill Date: 3/24/2020    E-Prescribing Status: Receipt confirmed by pharmacy (3/24/2020  3:01 PM CDT)    dextroamphetamine-amphetamine 30 mg Tab [[id number]]     Electronically signed by: Altaf Parikh DO on 03/24/20 1501     Patient is  amphetamine-dextroamphetamine (ADDERALL) 30 MG tablet and amphetamine-dextroamphetamine (ADDERALL XR) 30 MG 24 hr capsule  "

## 2020-04-22 NOTE — TELEPHONE ENCOUNTER
No I do not do early refills    She should be taking drug holidays, so the refills should actually last longer than 30 days

## 2020-06-24 ENCOUNTER — COMMUNICATION - HEALTHEAST (OUTPATIENT)
Dept: SLEEP MEDICINE | Facility: CLINIC | Age: 45
End: 2020-06-24

## 2020-06-24 DIAGNOSIS — G47.411 NARCOLEPSY WITH CATAPLEXY: ICD-10-CM

## 2020-06-24 DIAGNOSIS — G47.10 HYPERSOMNIA: ICD-10-CM

## 2020-07-21 ENCOUNTER — TELEPHONE (OUTPATIENT)
Dept: SLEEP MEDICINE | Facility: CLINIC | Age: 45
End: 2020-07-21

## 2020-07-21 DIAGNOSIS — G47.419 NARCOLEPSY WITHOUT CATAPLEXY(347.00): ICD-10-CM

## 2020-07-21 RX ORDER — DEXTROAMPHETAMINE SACCHARATE, AMPHETAMINE ASPARTATE, DEXTROAMPHETAMINE SULFATE AND AMPHETAMINE SULFATE 7.5; 7.5; 7.5; 7.5 MG/1; MG/1; MG/1; MG/1
30 TABLET ORAL 2 TIMES DAILY
Qty: 60 TABLET | Refills: 0 | Status: CANCELLED | OUTPATIENT
Start: 2020-07-21

## 2020-07-21 RX ORDER — DEXTROAMPHETAMINE SACCHARATE, AMPHETAMINE ASPARTATE MONOHYDRATE, DEXTROAMPHETAMINE SULFATE AND AMPHETAMINE SULFATE 7.5; 7.5; 7.5; 7.5 MG/1; MG/1; MG/1; MG/1
30 CAPSULE, EXTENDED RELEASE ORAL 2 TIMES DAILY
Qty: 60 CAPSULE | Refills: 0 | Status: CANCELLED | OUTPATIENT
Start: 2020-07-21

## 2020-07-21 NOTE — TELEPHONE ENCOUNTER
Per notes pt should be seen after covid for UDT and narcotic agreement. Please advise refill        amphetamine-dextroamphetamine (ADDERALL) 30 MG tablet   Last Written Prescription Date:  5/23/2020  Last Fill Quantity: 60,   # refills: 0  Last Office Visit: 4/8/2020  Future Office visit:  < 6 months     Routing refill request to provider for review/approval because:  Drug not on the G, P or  Health refill protocol or controlled substance]     amphetamine-dextroamphetamine (ADDERALL XR) 30 MG 24 hr      Last Written Prescription Date:  5/23/2020  Last Fill Quantity: 60,   # refills: 0  Last Office Visit: 4/8/2020  Future Office visit:   < 6months    Routing refill request to provider for review/approval because:  Drug not on the G, P or  Health refill protocol or controlled substance

## 2020-07-21 NOTE — TELEPHONE ENCOUNTER
Returned patients call per notes patient needs followup with Dr. Melgoza. Message to care coordinator also. Patient requesting refill of Adderall's.

## 2020-07-22 VITALS — BODY MASS INDEX: 28.56 KG/M2 | HEIGHT: 67 IN | WEIGHT: 182 LBS

## 2020-07-22 RX ORDER — ASPIRIN 325 MG/1
325 TABLET, FILM COATED ORAL DAILY
COMMUNITY
End: 2022-08-01

## 2020-07-22 RX ORDER — MULTIVITAMIN WITH IRON
1 TABLET ORAL 2 TIMES DAILY
COMMUNITY
End: 2020-11-06

## 2020-07-22 RX ORDER — DULOXETIN HYDROCHLORIDE 20 MG/1
20 CAPSULE, DELAYED RELEASE ORAL 2 TIMES DAILY
COMMUNITY
End: 2022-02-09 | Stop reason: DRUGHIGH

## 2020-07-22 ASSESSMENT — MIFFLIN-ST. JEOR: SCORE: 1490.24

## 2020-07-22 NOTE — PROGRESS NOTES
"Maddi Fam is a 45 year old female who is being evaluated via a billable video visit.      The patient has been notified of following:     \"This video visit will be conducted via a call between you and your physician/provider. We have found that certain health care needs can be provided without the need for an in-person physical exam.  This service lets us provide the care you need with a video conversation.  If a prescription is necessary we can send it directly to your pharmacy.  If lab work is needed we can place an order for that and you can then stop by our lab to have the test done at a later time.    Video visits are billed at different rates depending on your insurance coverage.  Please reach out to your insurance provider with any questions.    If during the course of the call the physician/provider feels a video visit is not appropriate, you will not be charged for this service.\"  `rbal consent for Video visit? Yes  How would you like to obtain your AVS? MyChart  If you are dropped from the video visit, the video invite should be resent to: Text to cell phone: [phone]  Will anyone else be joining your video visit? No        Video-Visit Details    Type of service:  Video Visit    Video Start Time: 10:30 AM  Video End Time: 10:44 AM    Originating Location (pt. Location): Home    Distant Location (provider location):  Home    Platform used for Video Visit: AmWell     -CHANGED TO [employer] DUE TO FAILURE TO CONNECT  Patient was on iphone    -Changed to telephone visit due to failure to connect with sarah Clark        Medication Follow-Up Visit:    Chief Complaint   Patient presents with     RECHECK     medication return visit         Maddi Fam comes in today for follow-up of narcolepsy     The patient was diagnosed with narcolepsy in 2004 at [facility]. Her initial PSG was completed in 2002 and did not show any sleep disordered breathing. She represented to Rajat Oswald at [facility] " in 2004 and reported her sleepiness improved and the subsequently reoccurred. She has a second sleep study 6/14/04 (145#) that again showed a normal AHI (no BLAKE). She completed an MSLT the following day (6/15/04) and this showed a mean latency of 6.3 minutes and 4 SOREMs in 5 naps    There does not appear to be actigraphy, subjective history regarding total sleep time, or urine drug screen done in 2004. Likewise medication list from that time is not available.    Unclear if/where she was treated from [id number]     Patient was seen by Jerson Brown at [facility] 07/06/2018 to establish care for narcolepsy with cataplexy taking Adderall XR 30 mg PO two times a day XR 30 mg short acting two times a day (120 mg per day but on most days but on some days 90 mg). The patient described brief moments of cataplexy that occur in infrequent clusters. She described brief episodes during which she feels that she is going to fall or he head is weak.    Her care was transferred to Dr. Whitman 1/2019 where concerns regarding higher than normal doses of Adderall was expressed. She described her cataplexy as feeling like her medication is not working and her body is shutting down. Then laughter, or emotion or tiredness brings it on. Then she gets feelings of extreme sleepiness and sleep attack. It was felt that these episodes were sleep attacks NOT cataplexy.    Genesight testing 1/29/2019  COMT genotype is associated with reduced therapeutic response to this drug: Amphetamine salts, dexmethylphendiate, dextroamphetamine, lisdexamfetamine, methylphenidate    In 2/2019 she entered into Pitolisant study, but it did not seem effective and was associated with weight gain    Care transferred to Jong Melgoza MD 4/8/2020    No unusual Rx in [facility] database 7/23/2020 except she had a RX from Dr. Parikh filled 6/24/20      Overall, the patient reports they are doing fine   Daily wakfulnesss is good.     Still having trouble falling asleep  "4-6/week. She is 'wide awake'. She denies difficulty turning her brain off.   She is also having some trouble falling back to sleep 4-6/week.    She takes melatonin 3mg at 6pm, avoiding caffeine in caffeine  She is using sunlamp 3-4/week for 30 minutes    Patient is not having medication side effects.     During work days bedtime is typically 11.  They are typically getting out of bed at 9.    On non-work days bedtime is typically 10-12. They are typically getting out of bed at 9.      She takes short acting in AM on awakening, then takes long-acting, repeats shirt acting 1 PM, last XR she takes most of the time before 3PM.       Patient is not  taking dug holidays  Patient birth control is tubal ligation     Total score - Porter: 15 (7/22/2020  1:36 PM)  LAZARUS Total Score: 16      Past medical/surgical history, family history, social history, medications and allergies were reviewed.      Problem List:  Patient Active Problem List    Diagnosis Date Noted     Tobacco use disorder 02/19/2020     Priority: Medium     Narcolepsy without cataplexy(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 PSG 6/14/04 and it showed a normal AHI (no BLAKE).  MSLT 6/15/04  showed a mean latency of 6.4 minutes and 4 SOREMs         Essential hypertension 10/13/2017     Priority: Medium     History of alcohol abuse 02/19/2014     Priority: Medium     S/p treatment about 2010       Migraine without aura 02/27/2008     Priority: Medium        Ht 1.689 m (5' 6.5\")   Wt 82.6 kg (182 lb)   BMI 28.94 kg/m      Impression/Plan:     Narcolepsy without cataplexy  - Doing well  - Continue current treatments.  - Encouraged drug holidays  - 3 months refills done     Sleep onset, maintenance difficulties   Suspect psychophysiologic insomnia. Sleep disturbance due to narcoplepsy may be suspected  We discussed stimulus control and sleep restriction  - Read the book Say Good Night To Insomnia    - Cognitive " behavioral training   - Consider trial of Darlyn gomez will follow up in about 3 month(s).     Twenty-five minutes spent with patient

## 2020-07-22 NOTE — NURSING NOTE
"Chief Complaint   Patient presents with     RECHECK     medication return visit       Initial Ht 1.689 m (5' 6.5\")   Wt 82.6 kg (182 lb)   BMI 28.94 kg/m   Estimated body mass index is 28.94 kg/m  as calculated from the following:    Height as of this encounter: 1.689 m (5' 6.5\").    Weight as of this encounter: 82.6 kg (182 lb).    Medication Reconciliation: complete        DME:no    "

## 2020-07-23 ENCOUNTER — VIRTUAL VISIT (OUTPATIENT)
Dept: SLEEP MEDICINE | Facility: CLINIC | Age: 45
End: 2020-07-23
Payer: COMMERCIAL

## 2020-07-23 DIAGNOSIS — F51.04 CHRONIC INSOMNIA: Primary | ICD-10-CM

## 2020-07-23 DIAGNOSIS — G47.419 NARCOLEPSY WITHOUT CATAPLEXY(347.00): ICD-10-CM

## 2020-07-23 PROCEDURE — 99214 OFFICE O/P EST MOD 30 MIN: CPT | Mod: GT | Performed by: INTERNAL MEDICINE

## 2020-07-23 RX ORDER — DEXTROAMPHETAMINE SACCHARATE, AMPHETAMINE ASPARTATE, DEXTROAMPHETAMINE SULFATE AND AMPHETAMINE SULFATE 7.5; 7.5; 7.5; 7.5 MG/1; MG/1; MG/1; MG/1
30 TABLET ORAL 2 TIMES DAILY
Qty: 60 TABLET | Refills: 0 | Status: SHIPPED | OUTPATIENT
Start: 2020-07-23 | End: 2020-10-20

## 2020-07-23 RX ORDER — DEXTROAMPHETAMINE SACCHARATE, AMPHETAMINE ASPARTATE, DEXTROAMPHETAMINE SULFATE AND AMPHETAMINE SULFATE 7.5; 7.5; 7.5; 7.5 MG/1; MG/1; MG/1; MG/1
30 TABLET ORAL 2 TIMES DAILY
Qty: 60 TABLET | Refills: 0 | Status: SHIPPED | OUTPATIENT
Start: 2020-09-22 | End: 2020-11-09

## 2020-07-23 RX ORDER — DEXTROAMPHETAMINE SACCHARATE, AMPHETAMINE ASPARTATE, DEXTROAMPHETAMINE SULFATE AND AMPHETAMINE SULFATE 7.5; 7.5; 7.5; 7.5 MG/1; MG/1; MG/1; MG/1
30 TABLET ORAL 2 TIMES DAILY
Qty: 60 TABLET | Refills: 0 | Status: SHIPPED | OUTPATIENT
Start: 2020-08-22 | End: 2020-11-09

## 2020-07-23 RX ORDER — DEXTROAMPHETAMINE SACCHARATE, AMPHETAMINE ASPARTATE MONOHYDRATE, DEXTROAMPHETAMINE SULFATE AND AMPHETAMINE SULFATE 7.5; 7.5; 7.5; 7.5 MG/1; MG/1; MG/1; MG/1
30 CAPSULE, EXTENDED RELEASE ORAL 2 TIMES DAILY
Qty: 60 CAPSULE | Refills: 0 | Status: SHIPPED | OUTPATIENT
Start: 2020-07-23 | End: 2020-07-23

## 2020-07-23 RX ORDER — DEXTROAMPHETAMINE SACCHARATE, AMPHETAMINE ASPARTATE MONOHYDRATE, DEXTROAMPHETAMINE SULFATE AND AMPHETAMINE SULFATE 7.5; 7.5; 7.5; 7.5 MG/1; MG/1; MG/1; MG/1
30 CAPSULE, EXTENDED RELEASE ORAL 2 TIMES DAILY
Qty: 60 CAPSULE | Refills: 0 | Status: SHIPPED | OUTPATIENT
Start: 2020-08-22 | End: 2020-10-20

## 2020-07-23 RX ORDER — DEXTROAMPHETAMINE SACCHARATE, AMPHETAMINE ASPARTATE MONOHYDRATE, DEXTROAMPHETAMINE SULFATE AND AMPHETAMINE SULFATE 7.5; 7.5; 7.5; 7.5 MG/1; MG/1; MG/1; MG/1
30 CAPSULE, EXTENDED RELEASE ORAL 2 TIMES DAILY
Qty: 60 CAPSULE | Refills: 0 | Status: SHIPPED | OUTPATIENT
Start: 2020-07-23 | End: 2020-11-09

## 2020-07-23 RX ORDER — DEXTROAMPHETAMINE SACCHARATE, AMPHETAMINE ASPARTATE MONOHYDRATE, DEXTROAMPHETAMINE SULFATE AND AMPHETAMINE SULFATE 7.5; 7.5; 7.5; 7.5 MG/1; MG/1; MG/1; MG/1
30 CAPSULE, EXTENDED RELEASE ORAL 2 TIMES DAILY
Qty: 60 CAPSULE | Refills: 0 | Status: SHIPPED | OUTPATIENT
Start: 2020-09-22 | End: 2020-11-09

## 2020-08-05 ENCOUNTER — TELEPHONE (OUTPATIENT)
Dept: SLEEP MEDICINE | Facility: CLINIC | Age: 45
End: 2020-08-05

## 2020-08-18 ENCOUNTER — TELEPHONE (OUTPATIENT)
Dept: SLEEP MEDICINE | Facility: CLINIC | Age: 45
End: 2020-08-18

## 2020-10-20 DIAGNOSIS — G47.419 NARCOLEPSY WITHOUT CATAPLEXY(347.00): ICD-10-CM

## 2020-10-20 DIAGNOSIS — F51.04 CHRONIC INSOMNIA: ICD-10-CM

## 2020-10-20 RX ORDER — DEXTROAMPHETAMINE SACCHARATE, AMPHETAMINE ASPARTATE, DEXTROAMPHETAMINE SULFATE AND AMPHETAMINE SULFATE 7.5; 7.5; 7.5; 7.5 MG/1; MG/1; MG/1; MG/1
30 TABLET ORAL 2 TIMES DAILY
Qty: 60 TABLET | Refills: 0 | Status: SHIPPED | OUTPATIENT
Start: 2020-10-22 | End: 2020-11-09

## 2020-10-20 RX ORDER — DEXTROAMPHETAMINE SACCHARATE, AMPHETAMINE ASPARTATE MONOHYDRATE, DEXTROAMPHETAMINE SULFATE AND AMPHETAMINE SULFATE 7.5; 7.5; 7.5; 7.5 MG/1; MG/1; MG/1; MG/1
30 CAPSULE, EXTENDED RELEASE ORAL 2 TIMES DAILY
Qty: 60 CAPSULE | Refills: 0 | Status: SHIPPED | OUTPATIENT
Start: 2020-10-22 | End: 2020-11-09

## 2020-10-20 NOTE — TELEPHONE ENCOUNTER
amphetamine-dextroamphetamine (ADDERALL XR) 30 MG 24 hr capsule      Last Written Prescription Date:  9/22/20  Last Fill Quantity: 60,   # refills: 0  Last Office Visit: 4/8/20  Future Office visit:  11/9/20     Routing refill request to provider for review/approval because:  Drug not on the [facility], [facility] or [facility] refill protocol or controlled substance      amphetamine-dextroamphetamine (ADDERALL) 30 MG tablet 60 tablet           Last Written Prescription Date:  9/22/20  Last Fill Quantity: 60,   # refills: 0  Last Office Visit: 4/8/20  Future Office visit:11/9/20       Routing refill request to provider for review/approval because:  Drug not on the [facility], [facility] or [facility] refill protocol or controlled substance

## 2020-10-20 NOTE — TELEPHONE ENCOUNTER
Reason for call: Per patient: I need a one month supply of amphetamine-dextroamphetamine (ADDERALL XR) 30 MG 24 hr capsule AND amphetamine-dextroamphetamine (ADDERALL) 30 MG tablet sen to the [facility] off [facility]    Phone number to reach patient:  Home number on file [phone]       Best Time:  Anytime    Can we leave a detailed message on this number?  YES    Travel screening: Not Applicable

## 2020-11-06 ASSESSMENT — MIFFLIN-ST. JEOR: SCORE: 1557.14

## 2020-11-06 NOTE — PROGRESS NOTES
"Maddi Fam is a 45 year old female who is being evaluated via a billable telephone visit.      The patient has been notified of following:     \"This telephone visit will be conducted via a call between you and your physician/provider. We have found that certain health care needs can be provided without the need for a physical exam.  This service lets us provide the care you need with a short phone conversation.  If a prescription is necessary we can send it directly to your pharmacy.  If lab work is needed we can place an order for that and you can then stop by our lab to have the test done at a later time.    Telephone visits are billed at different rates depending on your insurance coverage. During this emergency period, for some insurers they may be billed the same as an in-person visit.  Please reach out to your insurance provider with any questions.    If during the course of the call the physician/provider feels a telephone visit is not appropriate, you will not be charged for this service.\"    Patient has given verbal consent for Telephone visit?  Yes    What phone number would you like to be contacted at? [phone]    How would you like to obtain your AVS? MyChart      Medication Follow-Up Visit:    Chief Complaint   Patient presents with     RECHECK     MEDICATION F/U        Maddi Fam for follow-up of narcolepsy     The patient was diagnosed with narcolepsy in 2004 at [facility]. Her initial PSG was completed in 2002 and did not show any sleep disordered breathing. She represented to Rajat Oswald at [facility] in 2004 and reported her sleepiness improved and the subsequently reoccurred. She has a second sleep study 6/14/04 (145#) that again showed a normal AHI (no BLAKE). She completed an MSLT the following day (6/15/04) and this showed a mean latency of 6.3 minutes and 4 SOREMs in 5 naps     There does not appear to be actigraphy, subjective history regarding total sleep time, or urine drug screen " done in 2004. Likewise medication list from that time is not available.     Unclear if/where she was treated from [id number]     She says she was treated with Xyrem for a year in late 2000s, but has small children and  worked nights.     Patient was seen by Jerson Brown at [facility] 07/06/2018 to establish care for narcolepsy with cataplexy taking Adderall XR 30 mg PO two times a day XR 30 mg short acting two times a day (120 mg per day but on most days but on some days 90 mg). The patient described brief moments of cataplexy that occur in infrequent clusters. She described brief episodes during which she feels that she is going to fall or he head is weak.     Her care was transferred to Dr. Whitman 1/2019 where concerns regarding higher than normal doses of Adderall was expressed. She described her cataplexy as feeling like her medication is not working and her body is shutting down. Then laughter, or emotion or tiredness brings it on. Then she gets feelings of extreme sleepiness and sleep attack. It was felt that these episodes were sleep attacks NOT cataplexy.     Genesight testing 1/29/2019   COMT genotype is associated with reduced therapeutic response to this drug: Amphetamine salts, dexmethylphendiate, dextroamphetamine, lisdexamfetamine, methylphenidate     In 2/2019 she entered into Pitolisant study, but it did not seem effective and was associated with weight gain     Care transferred to Jong Melgoza MD 4/8/2020     No unusual Rx in [facility] database 11/9/2020 except she had a RX from Dr. Parikh filled 6/24/20      Overall, the patient reports the insomnia has improved. She has been reading Read the book Say Good Night To Insomnia.     She falls asleep okay, but awakens after 2-3 hours and tosses and turns for 3-4 hours. She is sleeping intermittently, but other times is wide awake (1-3/week).     She sometimes does not get up until 10 AM.     During work days bedtime is typically 11.     She gets  up at 8 and stay up 2-3 days a week.  They are typically getting out of bed at 10.      The patient is usually getting 9-10 hours of sleep per night.  Patient is not napping regularly. Son is distance learning.   Patient is taking dug holidays 1-2/week  Patient birth control is tubal ligation     Total score - Buchanan: 13 (11/6/2020 10:17 AM)        Past medical/surgical history, family history, social history, medications and allergies were reviewed.      Current Outpatient Medications   Medication Sig Dispense Refill     acetaminophen (TYLENOL) 500 MG tablet Take 500-1,000 mg by mouth       amphetamine-dextroamphetamine (ADDERALL XR) 30 MG 24 hr capsule Take 1 capsule (30 mg) by mouth 2 times daily 60 capsule 0     amphetamine-dextroamphetamine (ADDERALL) 30 MG tablet Take 1 tablet (30 mg) by mouth 2 times daily 60 tablet 0     aspirin - buffered (ASCRIPTIN) 325 MG TABS tablet Take 325 mg by mouth daily       DULoxetine (CYMBALTA) 20 MG capsule Take 20 mg by mouth 2 times daily       losartan (COZAAR) 50 MG tablet Take 50 mg by mouth       Multiple Vitamins-Minerals (MULTIVITAMIN & MINERAL) LIQD        naproxen (NAPROSYN) 500 MG tablet        potassium chloride (KAYCIEL) 20 MEQ/15ML (10%) solution TK 15 ML PO D       SUMAtriptan (IMITREX) 100 MG tablet Take 100 mg by mouth       topiramate (TOPAMAX) 25 MG tablet Take 50 mg by mouth daily          Problem List:  Patient Active Problem List    Diagnosis Date Noted     Tobacco use disorder 02/19/2020     Priority: Medium     Narcolepsy without cataplexy(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 PSG 6/14/04 and it showed a normal AHI (no BLAKE).  MSLT 6/15/04  showed a mean latency of 6.4 minutes and 4 SOREMs         Essential hypertension 10/13/2017     Priority: Medium     History of alcohol abuse 02/19/2014     Priority: Medium     S/p treatment about 2010       Migraine without aura 02/27/2008     Priority: Medium      "Chronic insomnia 07/23/2020     Priority: Low        Ht 1.702 m (5' 7\")   Wt 88.5 kg (195 lb)   BMI 30.54 kg/m      Impression/Plan:    Narcolepsy without cataplexy  Last Rx was for 10/22/20  - Continue current treatments.  - Encouraged drug holidays  - Given weight gain, snoring, sleep maintenance difficulties, long sleep times and the fact we are considering Miquelrem I recommended a Home Sleep ApneaTest to rule out interval development of obstructive sleep apnea   - 3 months refills done    Patients insurance does not cover an HST, changed to a Polysomnogram       Sleep maintenance difficulties   Suspect psychophysiologic insomnia. Sleep disturbance due to narcoplepsy may be suspected  Overall better, sleep onset difficulties have resolved. We reviewed stimulus control and sleep restriction  - Stressed addition of 1-2 naps during day and regular wake times. She takes melatonin 10mg at 6pm. Using lightbox in morning.     - Cognitive behavioral training   - Consider trial of Darlyn gomez will follow up in about 2 week(s) after study.     30 minutes spent with patient        "

## 2020-11-09 ENCOUNTER — VIRTUAL VISIT (OUTPATIENT)
Dept: SLEEP MEDICINE | Facility: CLINIC | Age: 45
End: 2020-11-09
Payer: COMMERCIAL

## 2020-11-09 VITALS — WEIGHT: 195 LBS | BODY MASS INDEX: 30.61 KG/M2 | HEIGHT: 67 IN

## 2020-11-09 DIAGNOSIS — R06.00 DYSPNEA AND RESPIRATORY ABNORMALITY: ICD-10-CM

## 2020-11-09 DIAGNOSIS — G47.419 NARCOLEPSY WITHOUT CATAPLEXY(347.00): ICD-10-CM

## 2020-11-09 DIAGNOSIS — E66.811 CLASS 1 OBESITY DUE TO EXCESS CALORIES WITHOUT SERIOUS COMORBIDITY WITH BODY MASS INDEX (BMI) OF 30.0 TO 30.9 IN ADULT: ICD-10-CM

## 2020-11-09 DIAGNOSIS — G47.9 DISTURBANCE IN SLEEP BEHAVIOR: ICD-10-CM

## 2020-11-09 DIAGNOSIS — I10 ESSENTIAL HYPERTENSION: ICD-10-CM

## 2020-11-09 DIAGNOSIS — F51.04 CHRONIC INSOMNIA: ICD-10-CM

## 2020-11-09 DIAGNOSIS — E66.09 CLASS 1 OBESITY DUE TO EXCESS CALORIES WITHOUT SERIOUS COMORBIDITY WITH BODY MASS INDEX (BMI) OF 30.0 TO 30.9 IN ADULT: ICD-10-CM

## 2020-11-09 DIAGNOSIS — R06.89 DYSPNEA AND RESPIRATORY ABNORMALITY: ICD-10-CM

## 2020-11-09 PROCEDURE — 99214 OFFICE O/P EST MOD 30 MIN: CPT | Mod: TEL | Performed by: INTERNAL MEDICINE

## 2020-11-09 RX ORDER — DEXTROAMPHETAMINE SACCHARATE, AMPHETAMINE ASPARTATE MONOHYDRATE, DEXTROAMPHETAMINE SULFATE AND AMPHETAMINE SULFATE 7.5; 7.5; 7.5; 7.5 MG/1; MG/1; MG/1; MG/1
30 CAPSULE, EXTENDED RELEASE ORAL 2 TIMES DAILY
Qty: 60 CAPSULE | Refills: 0 | Status: SHIPPED | OUTPATIENT
Start: 2020-11-21 | End: 2021-03-15

## 2020-11-09 RX ORDER — DEXTROAMPHETAMINE SACCHARATE, AMPHETAMINE ASPARTATE, DEXTROAMPHETAMINE SULFATE AND AMPHETAMINE SULFATE 7.5; 7.5; 7.5; 7.5 MG/1; MG/1; MG/1; MG/1
30 TABLET ORAL 2 TIMES DAILY
Qty: 60 TABLET | Refills: 0 | Status: SHIPPED | OUTPATIENT
Start: 2020-11-21 | End: 2021-05-03

## 2020-11-09 RX ORDER — DEXTROAMPHETAMINE SACCHARATE, AMPHETAMINE ASPARTATE MONOHYDRATE, DEXTROAMPHETAMINE SULFATE AND AMPHETAMINE SULFATE 7.5; 7.5; 7.5; 7.5 MG/1; MG/1; MG/1; MG/1
30 CAPSULE, EXTENDED RELEASE ORAL 2 TIMES DAILY
Qty: 60 CAPSULE | Refills: 0 | Status: SHIPPED | OUTPATIENT
Start: 2020-12-21 | End: 2021-05-03

## 2020-11-09 RX ORDER — DEXTROAMPHETAMINE SACCHARATE, AMPHETAMINE ASPARTATE MONOHYDRATE, DEXTROAMPHETAMINE SULFATE AND AMPHETAMINE SULFATE 7.5; 7.5; 7.5; 7.5 MG/1; MG/1; MG/1; MG/1
30 CAPSULE, EXTENDED RELEASE ORAL 2 TIMES DAILY
Qty: 60 CAPSULE | Refills: 0 | Status: SHIPPED | OUTPATIENT
Start: 2021-01-20 | End: 2021-05-03

## 2020-11-09 RX ORDER — DEXTROAMPHETAMINE SACCHARATE, AMPHETAMINE ASPARTATE, DEXTROAMPHETAMINE SULFATE AND AMPHETAMINE SULFATE 7.5; 7.5; 7.5; 7.5 MG/1; MG/1; MG/1; MG/1
30 TABLET ORAL 2 TIMES DAILY
Qty: 60 TABLET | Refills: 0 | Status: SHIPPED | OUTPATIENT
Start: 2020-12-21 | End: 2021-05-03

## 2020-11-09 RX ORDER — DEXTROAMPHETAMINE SACCHARATE, AMPHETAMINE ASPARTATE, DEXTROAMPHETAMINE SULFATE AND AMPHETAMINE SULFATE 7.5; 7.5; 7.5; 7.5 MG/1; MG/1; MG/1; MG/1
30 TABLET ORAL 2 TIMES DAILY
Qty: 60 TABLET | Refills: 0 | Status: SHIPPED | OUTPATIENT
Start: 2021-01-20 | End: 2021-03-15

## 2020-11-10 ENCOUNTER — TELEPHONE (OUTPATIENT)
Dept: SLEEP MEDICINE | Facility: CLINIC | Age: 45
End: 2020-11-10

## 2020-11-29 ENCOUNTER — HEALTH MAINTENANCE LETTER (OUTPATIENT)
Age: 45
End: 2020-11-29

## 2021-02-14 ENCOUNTER — HEALTH MAINTENANCE LETTER (OUTPATIENT)
Age: 46
End: 2021-02-14

## 2021-03-01 ENCOUNTER — TELEPHONE (OUTPATIENT)
Dept: SLEEP MEDICINE | Facility: CLINIC | Age: 46
End: 2021-03-01

## 2021-03-01 NOTE — TELEPHONE ENCOUNTER
Reason for Call:  Medication or medication refill:    Do you use a [facility]?  Name of the pharmacy and phone number for the current request:  [email or web address]    Name of the medication requested: adderall, adderall xr    Other request: patient also needs a sleep study schedule. Please follow up with patient.     Can we leave a detailed message on this number? YES    Phone number patient can be reached at: Cell number on file:    No relevant phone numbers on file.       Best Time: any    Call taken on 3/1/2021 at 9:42 AM by Taisha Cerna

## 2021-03-05 NOTE — TELEPHONE ENCOUNTER
Patient has been communicating with sleep staff in regards to the message, via [employer].    Zaina Mhor MA

## 2021-03-15 DIAGNOSIS — G47.419 NARCOLEPSY WITHOUT CATAPLEXY(347.00): ICD-10-CM

## 2021-03-15 RX ORDER — DEXTROAMPHETAMINE SACCHARATE, AMPHETAMINE ASPARTATE, DEXTROAMPHETAMINE SULFATE AND AMPHETAMINE SULFATE 7.5; 7.5; 7.5; 7.5 MG/1; MG/1; MG/1; MG/1
30 TABLET ORAL 2 TIMES DAILY
Qty: 60 TABLET | Refills: 0 | Status: SHIPPED | OUTPATIENT
Start: 2021-03-15 | End: 2021-05-04

## 2021-03-15 RX ORDER — DEXTROAMPHETAMINE SACCHARATE, AMPHETAMINE ASPARTATE MONOHYDRATE, DEXTROAMPHETAMINE SULFATE AND AMPHETAMINE SULFATE 7.5; 7.5; 7.5; 7.5 MG/1; MG/1; MG/1; MG/1
30 CAPSULE, EXTENDED RELEASE ORAL 2 TIMES DAILY
Qty: 60 CAPSULE | Refills: 0 | Status: SHIPPED | OUTPATIENT
Start: 2021-03-15 | End: 2021-05-04

## 2021-03-15 NOTE — TELEPHONE ENCOUNTER
amphetamine-dextroamphetamine (ADDERALL XR) 30 MG 24 hr capsule        Last Written Prescription Date:  1/20/21  Last Fill Quantity: 60,   # refills: 0  Last Office Visit: 11/9/21  Future Office visit:   4/12/21    Routing refill request to provider for review/approval because:  Drug not on the [facility], [facility] or  [facility] refill protocol or controlled substance          amphetamine-dextroamphetamine (ADDERALL) 30 MG tablet  Last Written Prescription Date:  1/20/21  Last Fill Quantity: 60,   # refills: 0  Last Office Visit: 11/9/21  Future Office visit:   4/12/21    Routing refill request to provider for review/approval because:  Drug not on the [facility] or [facility] refill protocol or controlled substance

## 2021-03-25 ENCOUNTER — THERAPY VISIT (OUTPATIENT)
Dept: SLEEP MEDICINE | Facility: CLINIC | Age: 46
End: 2021-03-25
Payer: COMMERCIAL

## 2021-03-25 DIAGNOSIS — G47.9 DISTURBANCE IN SLEEP BEHAVIOR: ICD-10-CM

## 2021-03-25 DIAGNOSIS — R06.89 DYSPNEA AND RESPIRATORY ABNORMALITY: ICD-10-CM

## 2021-03-25 DIAGNOSIS — E66.09 CLASS 1 OBESITY DUE TO EXCESS CALORIES WITHOUT SERIOUS COMORBIDITY WITH BODY MASS INDEX (BMI) OF 30.0 TO 30.9 IN ADULT: ICD-10-CM

## 2021-03-25 DIAGNOSIS — E66.811 CLASS 1 OBESITY DUE TO EXCESS CALORIES WITHOUT SERIOUS COMORBIDITY WITH BODY MASS INDEX (BMI) OF 30.0 TO 30.9 IN ADULT: ICD-10-CM

## 2021-03-25 DIAGNOSIS — I10 ESSENTIAL HYPERTENSION: ICD-10-CM

## 2021-03-25 DIAGNOSIS — F51.04 CHRONIC INSOMNIA: ICD-10-CM

## 2021-03-25 DIAGNOSIS — R06.00 DYSPNEA AND RESPIRATORY ABNORMALITY: ICD-10-CM

## 2021-03-25 DIAGNOSIS — G47.419 NARCOLEPSY WITHOUT CATAPLEXY(347.00): ICD-10-CM

## 2021-03-25 PROCEDURE — 95810 POLYSOM 6/> YRS 4/> PARAM: CPT | Performed by: INTERNAL MEDICINE

## 2021-03-30 NOTE — PROCEDURES
" SLEEP STUDY INTERPRETATION  DIAGNOSTIC POLYSOMNOGRAPHY REPORT      Patient: JOSE YARBROUGH  YOB: 1975  Study Date: 3/25/2021  MRN: [id number]  Referring Provider: -  Ordering Provider: Jong Melgoza MD    Indications for Polysomnography: The patient is a 46 year old Female who is 5' 7\" and weighs 195.0 lbs. Her BMI is 30.6, [place] sleepiness scale 13 and neck circumference is 41 cm. A diagnostic polysomnogram was performed to evaluate for narcolepsy with recent weight gain, snoring, sleep maintenance difficulties, long sleep times    Polysomnogram Data: A full night polysomnogram recorded the standard physiologic parameters including EEG, EOG, EMG, ECG, nasal and oral airflow. Respiratory parameters of chest and abdominal movements were recorded with respiratory inductance plethysmography. Oxygen saturation was recorded by pulse oximetry. Hypopnea scoring rule used: 1B 4%.    Sleep Architecture: Only 20 minutes of sleep recorded  The total recording time of the polysomnogram was 516.1 minutes. The total sleep time was 20.0 minutes. Sleep latency was increased at 482.9 minutes without the use of a sleep aid. REM latency was n/a. Arousal index was increased at 66.0 arousals per hour. Sleep efficiency was poor at 3.9%. Wake after sleep onset was 1.5 minutes. The patient spent 22.5% of total sleep time in Stage N1, 77.5% in Stage N2, 0.0% in Stage N3, and 0.0% in REM. Time in REM supine was 0 minutes.    Respiration:     Events ? The polysomnogram revealed a presence of 7 obstructive, 0 central, and 0 mixed apneas resulting in an apnea index of 21.0 events per hour. There were 0 obstructive hypopneas and 0 central hypopneas resulting in an obstructive hypopnea index of 0 and central hypopnea index of 0 events per hour. The combined (supine) apnea/hypopnea index was 21.0 events per hour (central apnea/hypopnea index was 0 events per hour). The REM AHI was n/a. The supine AHI was 21.0 events per hour. The " RERA index was 57.0 events per hour.  The RDI was 78.0 events per hour.    Snoring - was reported as moderate    Respiratory rate and pattern - was notable for normal respiratory rate and pattern.    Sustained Sleep Associated Hypoventilation - Transcutaneous carbon dioxide monitoring was not used, however significant hypoventilation was not suggested by oximetry    Sleep Associated Hypoxemia - (Greater than 5 minutes O2 sat at or below 88%) was not present. Baseline oxygen saturation was 97.6%. Lowest oxygen saturation was 91.2%. Time spent less than or equal to 88% was 0 minutes. Time spent less than or equal to 89% was 0 minutes.    Movement Activity:     Periodic Limb Activity - There were 0 PLMs during the entire study.     REM EMG Activity - REM was not achieved    Nocturnal Behavior - Abnormal sleep related behaviors were not noted     Bruxism - None apparent.    Cardiac Summary:   The average pulse rate was 81.4 bpm. The minimum pulse rate was 53.2 bpm while the maximum pulse rate was 110.0 bpm.  Arrhythmias were not noted.      Assessment:     Non-diagnostic study due to lack of sleep    Significant sleep disordered breathing was suggested during brief 20 minute episode of supine sleep.     Recommendations:    Recommend repeat polysomnography with use of a sleep aide.     Suggest optimizing sleep schedule     Diagnostic Codes:   Hypersomnia, Unspecified F51.11       _____________________________________   Electronically Signed By: Jong Melgoza MD 3/30/21           Range(%) Time in range (min)   0.0 - 89.0 -   0.0 - 88.0 -         Stage Min(mm Hg) Max(mm Hg)   Wake - -   NREM(1+2+3) - -   REM - -       Range(mmHg) Time in range (min)   55.0 - 100.0 -   Excluded data <20.0 & >65.0 516.5

## 2021-03-30 NOTE — PROGRESS NOTES
Called patient   Minimal sleep on sleep study, but sleep disordered breathing is suggested  Recommend repeat Polysomnogram with ambien, modest pre-study sleep deprivation (limit time in bed to 6 or 7 hours for 3 nights prior to study)  Orders placed

## 2021-04-01 LAB — SLPCOMP: NORMAL

## 2021-04-01 RX ORDER — ZOLPIDEM TARTRATE 5 MG/1
TABLET ORAL
Qty: 1 TABLET | Refills: 0 | Status: SHIPPED | OUTPATIENT
Start: 2021-04-01 | End: 2022-02-09

## 2021-04-27 ENCOUNTER — THERAPY VISIT (OUTPATIENT)
Dept: SLEEP MEDICINE | Facility: CLINIC | Age: 46
End: 2021-04-27
Payer: COMMERCIAL

## 2021-04-27 DIAGNOSIS — G47.9 DISTURBANCE IN SLEEP BEHAVIOR: ICD-10-CM

## 2021-04-27 DIAGNOSIS — I10 ESSENTIAL HYPERTENSION: ICD-10-CM

## 2021-04-27 DIAGNOSIS — E66.811 CLASS 1 OBESITY DUE TO EXCESS CALORIES WITHOUT SERIOUS COMORBIDITY WITH BODY MASS INDEX (BMI) OF 30.0 TO 30.9 IN ADULT: ICD-10-CM

## 2021-04-27 DIAGNOSIS — R06.00 DYSPNEA AND RESPIRATORY ABNORMALITY: ICD-10-CM

## 2021-04-27 DIAGNOSIS — R06.89 DYSPNEA AND RESPIRATORY ABNORMALITY: ICD-10-CM

## 2021-04-27 DIAGNOSIS — F51.04 CHRONIC INSOMNIA: ICD-10-CM

## 2021-04-27 DIAGNOSIS — E66.09 CLASS 1 OBESITY DUE TO EXCESS CALORIES WITHOUT SERIOUS COMORBIDITY WITH BODY MASS INDEX (BMI) OF 30.0 TO 30.9 IN ADULT: ICD-10-CM

## 2021-04-27 DIAGNOSIS — G47.419 NARCOLEPSY WITHOUT CATAPLEXY(347.00): ICD-10-CM

## 2021-04-27 PROCEDURE — 95810 POLYSOM 6/> YRS 4/> PARAM: CPT | Performed by: INTERNAL MEDICINE

## 2021-04-30 PROBLEM — G47.33 OSA (OBSTRUCTIVE SLEEP APNEA): Chronic | Status: ACTIVE | Noted: 2021-04-30

## 2021-04-30 NOTE — PROCEDURES
" SLEEP STUDY INTERPRETATION  DIAGNOSTIC POLYSOMNOGRAPHY REPORT      Patient: JOSE YARBROUGH  YOB: 1975  Study Date: 4/27/2021  MRN: [id number]  Referring Provider: Astrid Chow MD  Ordering Provider: Jong Melgoza MD    Indications for Polysomnography: The patient is a 46 year old Female who is 5' 7\" and weighs 195.0 lbs. Her BMI is 30.6, [place] sleepiness scale 15 and neck circumference is 35 cm. A diagnostic polysomnogram was performed to evaluate for history of Narcolepsy without cataplexy with weight gain, snoring, sleep maintenance difficulties, long sleep times, Contemplating sodium oxybate therapy.     Polysomnogram Data: A full night polysomnogram recorded the standard physiologic parameters including EEG, EOG, EMG, ECG, nasal and oral airflow. Respiratory parameters of chest and abdominal movements were recorded with respiratory inductance plethysmography. Oxygen saturation was recorded by pulse oximetry. Hypopnea scoring rule used: 1A 3%.    Sleep Architecture: Sleep was disrupted by arousals. Reduced REM sleep was noted. Dense spindle activity and alpha intrusions were noted.   The recording time of the polysomnogram was 560.4 minutes. The sleep time was 340.0 minutes. Sleep latency was increased at 22.0 minutes with the use of a sleep aid (zolpidem). REM latency was 476.0 minutes. Arousal index was increased at 41.6 arousals per hour. Sleep efficiency was decreased at 60.7%. Wake after sleep onset was 190.5 minutes. The patient spent 17.1% of total sleep time in Stage N1, 67.6% in Stage N2, 10.0% in Stage N3, and 5.3% in REM. Time in REM supine was 0 minutes.    Respiration: scored using 3% rules    Events ? The polysomnogram revealed a presence of 4 obstructive, 3 central, and 0 mixed apneas resulting in an apnea index of 1.2 events per hour. There were 66 obstructive hypopneas and 0 central hypopneas resulting in an obstructive hypopnea index of 11.6 and central hypopnea index of 0 " events per hour. The combined apnea/hypopnea index was 12.9 events per hour (central apnea/hypopnea index was 0.5 events per hour). The REM AHI was 6.7 events per hour. The supine AHI was 12.0 events per hour. The RERA index was 5.6 events per hour.  The RDI was 18.5 events per hour.        Snoring - was reported as moderate to loud.    Respiratory rate and pattern - was notable for normal respiratory rate and pattern.    Sustained Sleep Associated Hypoventilation - Transcutaneous carbon dioxide monitoring was not used, however significant hypoventilation was not suggested by oximetry    Sleep Associated Hypoxemia - (Greater than 5 minutes O2 sat at or below 88%) was not present. Baseline oxygen saturation was 96.7%. Lowest oxygen saturation was 88.0%. Time spent less than or equal to 88% was 0 minutes. Time spent less than or equal to 89% was 0 minutes.    Movement Activity:     Periodic Limb Activity - There were 4 PLMs during the entire study. The PLM index was 0.7 movements per hour. The PLM Arousal Index was 0.4 per hour.    REM EMG Activity - Excessive transient/sustained muscle activity was not present.    Nocturnal Behavior - Abnormal sleep related behaviors were not noted     Bruxism - None apparent.      Cardiac Summary:   The average pulse rate was 71.6 bpm. The minimum pulse rate was 59.4 bpm while the maximum pulse rate was 88.2 bpm.  Arrhythmias were not noted.        Assessment:     Mild obstructive sleep apnea    Recommendations:    Assess for benzodiazepine use history     Based on the presence of mild/moderate obstructive sleep apnea and excessive daytime sleepiness, treatment could be empirically initiated with Auto?titrating PAP therapy with a range of 5 to 15 cmH2O. Recommend clinical follow up with sleep management team.    Patient may be a candidate for dental appliance through referral to Sleep Dentistry for the treatment of obstructive sleep apnea and/or socially disruptive  snoring.    Advice regarding the risks of drowsy driving.    Suggest optimizing sleep schedule and avoiding sleep deprivation.    Weight management (if BMI > 30).    Pharmacologic therapy should be used for management of restless legs syndrome only if present and clinically indicated and not based on the presence of periodic limb movements alone.    Diagnostic Codes:   Obstructive Sleep Apnea G47.33      _____________________________________   Electronically Signed By: Jong Melgoza MD 4/30/21           Range(%) Time in range (min)   0.0 - 89.0 0.0   0.0 - 88.0 -         Stage Min(mm Hg) Max(mm Hg)   Wake - -   NREM(1+2+3) - -   REM - -       Range(mmHg) Time in range (min)   55.0 - 100.0 -   Excluded data <20.0 & >65.0 560.5

## 2021-05-03 LAB — SLPCOMP: NORMAL

## 2021-05-03 NOTE — PATIENT INSTRUCTIONS

## 2021-05-03 NOTE — PROGRESS NOTES
Ahmet is a 46 year old who is being evaluated via a billable video visit.      How would you like to obtain your AVS? MyChart  If the video visit is dropped, the invitation should be resent by: Text to cell phone: [phone]  Will anyone else be joining your video visit? No    Mera Ann MA on 5/3/2021 at 2:03 PM    Video Start Time: 2:29 PM  Video-Visit Details    Type of service:  Video Visit    Video End Time:2:55 PM    Originating Location (pt. Location): Home    Distant Location (provider location):  [facility] SLEEP [facility]     Platform used for Video Visit: [facility]       Sleep Study Follow-Up Visit:    Date on this visit: 5/4/2021    Maddi Fam comes in today for follow-up of her sleep study done on 4/27/2021 at the [facility].  A diagnostic polysomnogram was performed to evaluate for history of Narcolepsy without cataplexy with weight gain, snoring, sleep maintenance difficulties, long sleep times, Contemplating sodium oxybate therapy.     Polysomnogram interpreted by Dr Melgoza:   Polysomnogram Data: A full night polysomnogram recorded the standard physiologic parameters including EEG, EOG, EMG, ECG, nasal and oral airflow. Respiratory parameters of chest and abdominal movements were recorded with respiratory inductance plethysmography. Oxygen saturation was recorded by pulse oximetry. Hypopnea scoring rule used: 1A 3%.     Sleep Architecture: Sleep was disrupted by arousals. Reduced REM sleep was noted. Dense spindle activity and alpha intrusions were noted.   The recording time of the polysomnogram was 560.4 minutes. The sleep time was 340.0 minutes. Sleep latency was increased at 22.0 minutes with the use of a sleep aid (zolpidem). REM latency was 476.0 minutes. Arousal index was increased at 41.6 arousals per hour. Sleep efficiency was decreased at 60.7%. Wake after sleep onset was 190.5 minutes. The patient spent 17.1% of total sleep time in  Stage N1, 67.6% in Stage N2, 10.0% in Stage N3, and 5.3% in REM. Time in REM supine was 0 minutes.     Respiration: scored using 3% rules    Events ? The polysomnogram revealed a presence of 4 obstructive, 3 central, and 0 mixed apneas resulting in an apnea index of 1.2 events per hour. There were 66 obstructive hypopneas and 0 central hypopneas resulting in an obstructive hypopnea index of 11.6 and central hypopnea index of 0 events per hour. The combined apnea/hypopnea index was 12.9 events per hour (central apnea/hypopnea index was 0.5 events per hour). The REM AHI was 6.7 events per hour. The supine AHI was 12.0 events per hour. The RERA index was 5.6 events per hour.  The RDI was 18.5 events per hour.         Snoring - was reported as moderate to loud.    Respiratory rate and pattern - was notable for normal respiratory rate and pattern.    Sustained Sleep Associated Hypoventilation - Transcutaneous carbon dioxide monitoring was not used, however significant hypoventilation was not suggested by oximetry    Sleep Associated Hypoxemia - (Greater than 5 minutes O2 sat at or below 88%) was not present. Baseline oxygen saturation was 96.7%. Lowest oxygen saturation was 88.0%. Time spent less than or equal to 88% was 0 minutes. Time spent less than or equal to 89% was 0 minutes.     Movement Activity:     Periodic Limb Activity - There were 4 PLMs during the entire study. The PLM index was 0.7 movements per hour. The PLM Arousal Index was 0.4 per hour.    REM EMG Activity - Excessive transient/sustained muscle activity was not present.    Nocturnal Behavior - Abnormal sleep related behaviors were not noted     Bruxism - None apparent.        Cardiac Summary:   The average pulse rate was 71.6 bpm. The minimum pulse rate was 59.4 bpm while the maximum pulse rate was 88.2 bpm.  Arrhythmias were not noted.              Past medical/surgical history, family history, social history, medications and allergies were reviewed.       Problem List:  Patient Active Problem List    Diagnosis Date Noted     BLAKE (obstructive sleep apnea)- mild (AHI 12) 04/30/2021     Priority: Medium     4/27/2021 [facility] (195.0 lbs) - scored using 3% rules AHI 12.9, RDI 18.5, Supine AHI 12.0, REM AHI 6.7, Low O2 88.0%, Time Spent ?88% 0 minutes / Time Spent ?89% 0 minutes.       Chronic insomnia 07/23/2020     Priority: Medium     Tobacco use disorder 02/19/2020     Priority: Medium     Narcolepsy without cataplexy(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 PSG 6/14/04 and it showed a normal AHI (no BLAKE).  MSLT 6/15/04  showed a mean latency of 6.4 minutes and 4 SOREMs         Essential hypertension 10/13/2017     Priority: Medium     History of alcohol abuse 02/19/2014     Priority: Medium     S/p treatment about 2010       Migraine without aura 02/27/2008     Priority: Medium        Impression/Plan:  1. Mild obstructive sleep apnea-  Polysomnogram was reviewed in detail today.  Counseled the patient that mild sleep apnea is not felt to significantly increase long-term cardiovascular risk, but can contribute to excessive daytime sleepiness.    Treatment options discussed today including auto-CPAP, oral appliance therapy, weight loss or surgical options.  Elected treatment of sleep apnea and excessive daytime sleepiness with auto-CPAP 6-15 cm/H20.  Patient out of narcolepsy medication (adderall and Adderall xr) and a refill x30 days sent.     She will follow up with me in about 8 week(s).     Ray Quan PA-C

## 2021-05-04 ENCOUNTER — VIRTUAL VISIT (OUTPATIENT)
Dept: SLEEP MEDICINE | Facility: CLINIC | Age: 46
End: 2021-05-04
Payer: COMMERCIAL

## 2021-05-04 DIAGNOSIS — G47.33 OSA (OBSTRUCTIVE SLEEP APNEA): Primary | ICD-10-CM

## 2021-05-04 DIAGNOSIS — F51.04 CHRONIC INSOMNIA: ICD-10-CM

## 2021-05-04 DIAGNOSIS — G47.419 NARCOLEPSY WITHOUT CATAPLEXY(347.00): ICD-10-CM

## 2021-05-04 PROCEDURE — 99214 OFFICE O/P EST MOD 30 MIN: CPT | Mod: GT | Performed by: PHYSICIAN ASSISTANT

## 2021-05-04 RX ORDER — DEXTROAMPHETAMINE SACCHARATE, AMPHETAMINE ASPARTATE MONOHYDRATE, DEXTROAMPHETAMINE SULFATE AND AMPHETAMINE SULFATE 7.5; 7.5; 7.5; 7.5 MG/1; MG/1; MG/1; MG/1
30 CAPSULE, EXTENDED RELEASE ORAL 2 TIMES DAILY
Qty: 60 CAPSULE | Refills: 0 | Status: SHIPPED | OUTPATIENT
Start: 2021-05-04 | End: 2021-06-22

## 2021-05-04 RX ORDER — DEXTROAMPHETAMINE SACCHARATE, AMPHETAMINE ASPARTATE, DEXTROAMPHETAMINE SULFATE AND AMPHETAMINE SULFATE 7.5; 7.5; 7.5; 7.5 MG/1; MG/1; MG/1; MG/1
30 TABLET ORAL 2 TIMES DAILY
Qty: 60 TABLET | Refills: 0 | Status: SHIPPED | OUTPATIENT
Start: 2021-05-04 | End: 2021-06-22

## 2021-05-05 NOTE — NURSING NOTE
LM for patient call back.  Wanting to know what DME she would like to use and to let her know she should schedule a 2 month follow up once getting set up with CPAP.

## 2021-05-26 NOTE — TELEPHONE ENCOUNTER
"Called the patient, no answer, left a brief message stating that \"the prescription you have requested has been sent to your pharmacy on file\". Left a number to call back if any issues, questions, etc.    Aracelis Castanon CMA 3/22/2019 8:39 AM    "

## 2021-05-27 ENCOUNTER — DOCUMENTATION ONLY (OUTPATIENT)
Dept: SLEEP MEDICINE | Facility: CLINIC | Age: 46
End: 2021-05-27

## 2021-05-27 DIAGNOSIS — F51.04 CHRONIC INSOMNIA: ICD-10-CM

## 2021-05-27 DIAGNOSIS — G47.419 NARCOLEPSY WITHOUT CATAPLEXY(347.00): ICD-10-CM

## 2021-05-27 DIAGNOSIS — G47.33 OSA (OBSTRUCTIVE SLEEP APNEA): ICD-10-CM

## 2021-05-27 NOTE — PROGRESS NOTES
VD IN-HOUSE CALL NOTE THAT PATIENT WOULD LIKE TO BE SETUP ON PAP. PT HAS BEEN SCHEDULED FOR A SETUP ON 06/01/2021 AT 11AM IN [place] WITH CAILIN ALMANZAR

## 2021-05-27 NOTE — TELEPHONE ENCOUNTER
Dr Whitman,    Patient called stating [facility] received only one of the Adderall scripts, the XR was received but the Short Acting Medication was not.    Thank you .    Contact :  [phone]

## 2021-05-27 NOTE — TELEPHONE ENCOUNTER
Last office visit: 2/15/19. Please review and advise.    Aracelis Castanon CMA 3/25/2019 2:49 PM

## 2021-05-28 NOTE — TELEPHONE ENCOUNTER
Dr Whitman,  Patient needs refills for both Adderall meds.    Short acting and ,Adderall XR    Thank you.

## 2021-05-28 NOTE — TELEPHONE ENCOUNTER
Requested Prescriptions     Pending Prescriptions Disp Refills     ADDERALL XR 30 mg 24 hr capsule 60 capsule 0     Sig: Take 1 capsule (30 mg total) by mouth 2 (two) times a day.     dextroamphetamine-amphetamine 30 mg Tab 60 tablet 0     Sig: Take 30 mg by mouth 2 (two) times a day.     Last visit: 2/15/19. Routing to Dr. Whitman to advise on refills.    Aracelis Castanon CMA 4/22/2019 2:30 PM

## 2021-05-28 NOTE — TELEPHONE ENCOUNTER
Requested Prescriptions     Pending Prescriptions Disp Refills     ADDERALL XR 30 mg 24 hr capsule 60 capsule 0     Sig: Take 1 capsule (30 mg total) by mouth 2 (two) times a day.     dextroamphetamine-amphetamine 30 mg Tab 60 tablet 0     Sig: Take 30 mg by mouth 2 (two) times a day.     Last visit: 2/15/19. Please review and advise.    Aracelis Castanon CMA 5/20/2019 1:07 PM

## 2021-05-28 NOTE — TELEPHONE ENCOUNTER
Dr Whitman,    Patient requests medication refills for Addderall XR30   And  Adderall short lasting.    Thank you    Contact:  [phone]

## 2021-05-29 NOTE — TELEPHONE ENCOUNTER
Requested Prescriptions     Pending Prescriptions Disp Refills     ADDERALL XR 30 mg 24 hr capsule 60 capsule 0     Sig: Take 1 capsule (30 mg total) by mouth 2 (two) times a day.     dextroamphetamine-amphetamine 30 mg Tab 60 tablet 0     Sig: Take 30 mg by mouth 2 (two) times a day.     Last visit: 4/23/19    Aracelis Castanon CMA 6/20/2019 3:45 PM

## 2021-05-30 NOTE — TELEPHONE ENCOUNTER
Requested Prescriptions     Pending Prescriptions Disp Refills     ADDERALL XR 30 mg 24 hr capsule 60 capsule 0     Sig: Take 1 capsule (30 mg total) by mouth 2 (two) times a day.     dextroamphetamine-amphetamine 30 mg Tab 60 tablet 0     Sig: Take 30 mg by mouth 2 (two) times a day.     Last visit: 4/23/19; Please review and advise.    Aracelis Castanon CMA 7/19/2019 1:09 PM

## 2021-05-31 NOTE — TELEPHONE ENCOUNTER
[id number] - [facility], [place] - [address]  [phone]

## 2021-06-01 ENCOUNTER — DOCUMENTATION ONLY (OUTPATIENT)
Dept: SLEEP MEDICINE | Facility: CLINIC | Age: 46
End: 2021-06-01

## 2021-06-01 VITALS — HEIGHT: 66 IN | WEIGHT: 155 LBS | BODY MASS INDEX: 24.91 KG/M2

## 2021-06-01 VITALS — WEIGHT: 164.38 LBS | BODY MASS INDEX: 25.8 KG/M2 | HEIGHT: 67 IN

## 2021-06-01 VITALS — WEIGHT: 158.19 LBS | BODY MASS INDEX: 24.83 KG/M2 | HEIGHT: 67 IN

## 2021-06-01 VITALS — HEIGHT: 67 IN | BODY MASS INDEX: 26.06 KG/M2 | WEIGHT: 166 LBS

## 2021-06-01 NOTE — PROGRESS NOTES
Patient was offered choice of vendor and chose [facility].  Patient Maddi Fam was set up at [facility]  on June 1, 2021. Patient received a Resmed AirSense 10 Auto. Pressures were set at 6-15 cm H2O.   Patient s ramp is 5 cm H2O for Auto and FLEX/EPR is 2.  Patient received a Resmed Mask name: AIRFIT P30I  Pillow mask size Small, heated tubing and heated humidifier.  Patient does need to meet compliance. Patient has a follow up on WILL CALL TO SCHEDULE with CARINE Alegria

## 2021-06-01 NOTE — TELEPHONE ENCOUNTER
Requested Prescriptions     Pending Prescriptions Disp Refills     ADDERALL XR 30 mg 24 hr capsule 60 capsule 0     Sig: Take 1 capsule (30 mg total) by mouth 2 (two) times a day.     dextroamphetamine-amphetamine 30 mg Tab 60 tablet 0     Sig: Take 30 mg by mouth 2 (two) times a day.     Last visit: 4/23/19, was advised to follow up in 2 months. No future appointments are scheduled at this time.    Aracelis Castanon, CHAYITO 9/18/2019 11:44 AM

## 2021-06-02 VITALS — HEIGHT: 67 IN | WEIGHT: 167 LBS | BODY MASS INDEX: 26.21 KG/M2

## 2021-06-02 VITALS — BODY MASS INDEX: 25.98 KG/M2 | WEIGHT: 165.5 LBS | HEIGHT: 67 IN

## 2021-06-02 NOTE — TELEPHONE ENCOUNTER
Dr Whitman,    Patient noted that you tried to call; she will be at this number for some time.      Thank you.

## 2021-06-02 NOTE — TELEPHONE ENCOUNTER
Maddi Ricardo stated the pharmacy [facility] in [place] needs a PA for the short acting Adderall.      Not Certain - Ph. Or FX: .[phone]    Thank you.

## 2021-06-02 NOTE — TELEPHONE ENCOUNTER
Prior Authorization Request  Who s requesting:  Pharmacy(per patient)  Pharmacy Name and Location: [facility]  Medication Name: dextroamphetamine-amphetamine 30 mg Tab  Insurance Plan:   [employer] [facility] [employer] [place] [address] [id number]   Insurance Member ID Number:  [id number]  Informed patient that prior authorizations can take up to 10 business days for response:   No  Okay to leave a detailed message: Alta Castanon CMA 10/30/2019 1:08 PM

## 2021-06-02 NOTE — TELEPHONE ENCOUNTER
Central PA team  [phone]  Pool: HE PA MED ([id number])          PA has been initiated.       PA form completed and faxed insurance via Cover My Meds     Hiugera:  JOSE YARBROUGH (Key: [id number])     Medication:  Amphetamine-Dextroamphetamine 30MG tablets    Insurance:  [employer]        Response will be received via fax and may take up to 5-10 business days depending on plan

## 2021-06-04 ENCOUNTER — DOCUMENTATION ONLY (OUTPATIENT)
Dept: SLEEP MEDICINE | Facility: CLINIC | Age: 46
End: 2021-06-04

## 2021-06-04 DIAGNOSIS — G47.33 OSA (OBSTRUCTIVE SLEEP APNEA): ICD-10-CM

## 2021-06-04 DIAGNOSIS — G47.419 NARCOLEPSY WITHOUT CATAPLEXY(347.00): ICD-10-CM

## 2021-06-04 DIAGNOSIS — F51.04 CHRONIC INSOMNIA: ICD-10-CM

## 2021-06-04 NOTE — PROGRESS NOTES
3 day Sleep therapy management telephone visit    Diagnostic AHI: 12.9  PSG    Confirmed with patient at time of call- N/A Patient is still interested in STM service       Message left for patient to return call    Replacement device: No  STM ordered by provider: Yes    Objective data     Order Settings for PAP  CPAP min 6    CPAP max 15        Device settings from machine CPAP min 6.0     CPAP max 15.0      EPR Setting TWO    RESMED soft response  OFF     Assessment: Nightly usage over four hours      Action plan: Patient to have 14 day STM visit. Patient has a follow up visit scheduled:   no, but is required by insurance for compliance     Total time spent on accessing and  interpreting remote patient PAP therapy data  10 minutes    Total time spent counseling, coaching  and reviewing PAP therapy data with patient  0 minutes    [id number] no

## 2021-06-07 NOTE — TELEPHONE ENCOUNTER
The other medication that was prescribed by Dr. Whitman was:   dextroamphetamine-amphetamine 30 mg Tab  30 mg, Oral, 2 times daily

## 2021-06-07 NOTE — TELEPHONE ENCOUNTER
"Patient calling to request that Dr. Parikh call [facility] to approve and \"call in\" the early dispensing of the dextroamphetamine-amphetamine 30 mg Tab - [facility] stated to the patient that the Rx is set not to dispense before 4/23/20 - patient has already taken her last available dose and would like to  today    [facility] #[id number] - [facility], [place] - [address] [facility] AT [address]     PH: [phone]       "

## 2021-06-07 NOTE — TELEPHONE ENCOUNTER
Dr Parikh,  While you  refilled the long lasting medication but did not send in script for   - Adderall 30 2x/day - short acting.    Please advise.    Thank you.

## 2021-06-08 ENCOUNTER — TELEPHONE (OUTPATIENT)
Dept: SLEEP MEDICINE | Facility: CLINIC | Age: 46
End: 2021-06-08

## 2021-06-08 DIAGNOSIS — G47.419 NARCOLEPSY WITHOUT CATAPLEXY(347.00): ICD-10-CM

## 2021-06-08 NOTE — TELEPHONE ENCOUNTER
Reason for call:  Medication   If this is a refill request, has the caller requested the refill from the pharmacy already? Yes  Will the patient be using a [facility]? No  Name of the pharmacy and phone number for the current request: [facility] [phone]    Name of the medication requested: adderall 30 mg, adderall xr 30mg    Other request: n/a    Phone number to reach patient:  Cell number on file:    Telephone Information:   Mobile [phone]       Best Time:  any    Can we leave a detailed message on this number?  YES    Travel screening: Negative

## 2021-06-16 ENCOUNTER — DOCUMENTATION ONLY (OUTPATIENT)
Dept: SLEEP MEDICINE | Facility: CLINIC | Age: 46
End: 2021-06-16

## 2021-06-16 DIAGNOSIS — G47.419 NARCOLEPSY WITHOUT CATAPLEXY(347.00): ICD-10-CM

## 2021-06-16 DIAGNOSIS — G47.33 OSA (OBSTRUCTIVE SLEEP APNEA): ICD-10-CM

## 2021-06-16 DIAGNOSIS — F51.04 CHRONIC INSOMNIA: ICD-10-CM

## 2021-06-16 NOTE — TELEPHONE ENCOUNTER
Telephone Encounter by Aye Prasad at 10/31/2019  1:46 PM     Author: Aye Prasad Service: -- Author Type: --    Filed: 10/31/2019  1:55 PM Encounter Date: 10/29/2019 Status: Signed    : Aye Prasad APPROVED:    Approval start date: 09/30/2019  Approval end date: 10/30/2020    Pharmacy has been notified of approval and will contact patient when medication is ready for pickup.

## 2021-06-16 NOTE — Clinical Note
Hi I just spoke with this patient regarding rescheduling her appointment she missed with you the other day.  I got her rescheduled but not until 9/2/21.  She is looking for a refill on her meds.  I think there was a telephone message previously from her but I don't know if it got to you yet....      She is struggling with soreness from the PAP hopefully this will get better!  She has another STM in a few weeks.    Simran

## 2021-06-16 NOTE — PROGRESS NOTES
14  DAY STM VISIT    Diagnostic AHI: 12.9    PSG    Message left for patient to return call     Assessment: Pt not meeting objective benchmarks for compliance       Action plan: waiting for patient to return call.  and pt to have 30 day STM visit.      Device type: Auto-CPAP    PAP settings: CPAP min 6.0 cm  H20       CPAP max 15.0 cm  H20          95th% pressure 11.1 cm  H20        RESMED EPR level Setting: TWO    RESMED Soft response setting:  OFF    Mask type:  Nasal Pillows    Objective measures: 14 day rolling measures      Compliance  14 %      Leak  9.09  lpm  last  upload      AHI 1.93   last  upload      Average number of minutes 97      Objective measure goal  Compliance   Goal >70%  Leak   Goal < 24 lpm  AHI  Goal < 5  Usage  Goal >240        Total time spent on accessing and interpreting remote patient PAP therapy data  10 minutes    Total time spent counseling, coaching  and reviewing PAP therapy data with patient  0 minutes    [id number]  [id number]  no (3 day STM)

## 2021-06-18 NOTE — PROGRESS NOTES
Assessment/Plan:     1. Narcolepsy with cataplexy  Significant history of narcolepsy with cataplexy.  Refilled Adderall for patient.  She does best with name brand extended release and the immediate release does not require name brand.  I also place a referral to sleep medicine for further evaluation and ongoing management until she can get back in with no [facility].  She did complete a controlled substance agreement form for ongoing use of her Adderall.  She has well maintained on her current dosing I did not change any of the dosing today.  Discussed risks and benefits of Adderall use as well as side effects.  Patient is in agreement with this plan.  Plan following up at minimum every 6 months however I would anticipate that she will reestablish with [facility] for ongoing management of her narcolepsy.  However if unable to I would like sleep medicine to ultimately take this over.  She is in agreement this plan.  - dextroamphetamine-amphetamine 30 mg Tab; Take 30 mg by mouth 2 (two) times a day.  Dispense: 60 tablet; Refill: 0  - Ambulatory referral to Sleep Medicine  - ADDERALL XR 30 mg 24 hr capsule; Take 1 capsule (30 mg total) by mouth 2 (two) times a day.  Dispense: 60 capsule; Refill: 0    2. Hypersomnia  Same as above see #1.  - dextroamphetamine-amphetamine 30 mg Tab; Take 30 mg by mouth 2 (two) times a day.  Dispense: 60 tablet; Refill: 0  - Ambulatory referral to Sleep Medicine  - ADDERALL XR 30 mg 24 hr capsule; Take 1 capsule (30 mg total) by mouth 2 (two) times a day.  Dispense: 60 capsule; Refill: 0    3. Essential hypertension  History of hypertension.  Well managed currently on medications.  She does not require refill today.  She will let me know when she needs this.  Will check a basic metabolic panel given her history of hypokalemia.  - Basic Metabolic Panel    4. Hypokalemia  Currently on 20 mEq of potassium daily.  Will check a potassium level today and determine if  medication management changes are required such as stopping the hydrochlorothiazide.  Or if needed increasing the potassium or maintaining the current dose.  She is in agreement this plan.  - Basic Metabolic Panel    5. Migraine  Migraines are well managed on daily topiramate.  Plan on continuing on this medication for now with eventual return to [place] [facility] for ongoing management.    6. Controlled substance agreement signed          Subjective:      Maddi Fam is a 43 y.o. female who presents to [facility]. She has a significant history of narcolepsy with cataplexy. For management of her symptoms she has been seeing Dr. Irene he is at [facility] in [place]. She is needing to set up a payment plan with this clinic so she can get back in with them.  Due to currently being unemployed she has a minimal funds to set up an initial payment and a payment plan with them.  She has been un-employed since March 20th. She is now on medicaid until she finds a new job. She currently collects un-employment.  Other medical history includes hypertension, migraine, panic attacks, depression, and anxiety.  When she was being seen at [facility] she was having them manage her narcolepsy with cataplexy, migraine headaches, as well as her mental health regarding her depression and anxiety.  She reports that she has plenty of medications regarding her other diagnoses.  She is currently on daily topiramate for management of her migraines as well as as needed Imitrex as well as tizanidine.  She reports that the Imitrex is rarely used and has not been used since early April.  Tizanidine is also rarely used and only used if she is feeling that she is having increasing muscle spasms which does trigger migraines for her.  She does her best to avoid both of these medications as it increases her symptoms of narcolepsy.  She also has a history of hypokalemia.  Some of this is contributed by her blood pressure  medication.  She is currently taking 5 mg of amlodipine along with lisinopril and hydrochlorothiazide 20-25mg daily.  At this time she reports that she does not need any refills of her medications.  She intends on starting to come here to this clinic for continued management of her blood pressure.  She is hopeful to get back in [facility] to continue with management of her other medical issues.  However, today she is requiring a refill of her Adderall which she uses for her narcolepsy with cataplexy.  She has been out of this medication for the last 5 days and has had significant symptoms of her narcolepsy due to this.  She typically takes up to 30 mg single release doses in a day as well as to 30 mg extended release doses of the day of Adderall.  In the morning she typically takes 30 mg single release and about 2 hours later will take an extended release tablet.  Over lunch she will then take another immediate release tablet and then early afternoon/midafternoon will take another extended release tablet.  On average she goes to bed around 9 PM and wakes up around 5 AM.  If her day timing of her symptoms is differently than she changes her she takes medication she may not take as many of the extended release or the single release.  This dosing was but she was on when she was working full-time.  If she does not take her medication she has an overall foggy feeling and frequent yawning as well as falling asleep at inopportune times.  She denies falling asleep behind the wheel.  This summer she will be taking care of her grandson.  She was initially diagnosed with narcolepsy with cataplexy in 2004.  When she takes her medications she has a significant improvement in her ability to function as well as her overall health throughout the day.  Adequate management of her narcolepsy also seems to decrease the incidence of her migraines.    She denies any chest pain, shortness of breath, difficulty breathing.  She also denies any  nausea, vomiting, or diarrhea.  She denies any weight loss or loss of appetite.     The following portions of the patient's history were reviewed and updated as appropriate: allergies, current medications, past family history, past medical history, past social history, past surgical history and problem list.    Past Medical History:   Diagnosis Date     Hypertension      Hypokalemia      Migraine with aura and without status migrainosus      Narcolepsy and cataplexy      Past Surgical History:   Procedure Laterality Date     BREAST SURGERY        SECTION, CLASSIC  1994     TUBAL LIGATION       Social History     Social History     Marital status:      Spouse name: N/A     Number of children: N/A     Years of education: N/A     Occupational History     Not on file.     Social History Main Topics     Smoking status: Current Every Day Smoker     Packs/day: 0.50     Smokeless tobacco: Never Used      Comment: trying to quit     Alcohol use No     Drug use: No     Sexual activity: Yes     Partners: Male     Other Topics Concern     Not on file     Social History Narrative     No narrative on file     Current Outpatient Prescriptions   Medication Sig Note     amLODIPine (NORVASC) 5 MG tablet Take 5 mg by mouth daily. 2018: Received from: [facility] Received Sig: Take 1 Tab by mouth daily.     dextroamphetamine-amphetamine (ADDERALL XR) 30 MG 24 hr capsule Take 30 mg by mouth 2 (two) times a day. 2018: Received from: [facility] Received Sig: Take 30 mg by mouth two times a day.     dextroamphetamine-amphetamine 30 mg Tab Take 30 mg by mouth 2 (two) times a day.      lisinopril-hydrochlorothiazide (PRINZIDE,ZESTORETIC) 20-25 mg per tablet Take 1 tablet by mouth daily. 2018: Received from: [facility] Received Sig: Take 1 Tab by mouth daily.     potassium chloride (K-DUR,KLOR-CON) 20 MEQ tablet Take 20 mEq by mouth daily. 2018: Received from: [facility] Received Sig:  "Take 20 mEq by mouth daily.     SUMAtriptan (IMITREX) 100 MG tablet Take 100 mg by mouth as needed. 6/7/2018: Received from: [facility] Received Sig: Take 100 mg by mouth as needed for Migraine. May repeat after 2 hours if needed. Max 2 tabs/24 hours. Max 9 days/month     tiZANidine (ZANAFLEX) 2 MG tablet Take 1 tablet by mouth as needed. 6/7/2018: Received from: [facility] Received Sig: TK 1-2 TS PO HS PRF CERVICAL SPASM     topiramate (TOPAMAX) 100 MG tablet Take 100 mg by mouth 2 (two) times a day. 6/7/2018: Received from: [facility] Received Sig: Take 1 tablet by mouth 2 times daily.     ADDERALL XR 30 mg 24 hr capsule Take 1 capsule (30 mg total) by mouth 2 (two) times a day.      Social History     Social History     Marital status:      Spouse name: N/A     Number of children: N/A     Years of education: N/A     Occupational History     Not on file.     Social History Main Topics     Smoking status: Current Every Day Smoker     Packs/day: 0.50     Smokeless tobacco: Never Used      Comment: trying to quit     Alcohol use No     Drug use: No     Sexual activity: Yes     Partners: Male     Other Topics Concern     Not on file     Social History Narrative     No narrative on file     Review of Systems   Pertinent items are noted in HPI.      Objective:      /70 (Patient Site: Right Arm, Patient Position: Sitting, Cuff Size: Adult Regular)  Pulse 80  Temp 98.1  F (36.7  C) (Oral)   Resp 16  Ht 5' 6.5\" (1.689 m)  Wt 158 lb 3 oz (71.8 kg)  LMP 05/26/2018 (Exact Date)  Breastfeeding? No Comment: H/o tubal  BMI 25.15 kg/m2    General appearance: alert, appears stated age and cooperative  Head: Normocephalic, without obvious abnormality, atraumatic  Neck: no adenopathy, no carotid bruit, no JVD, supple, symmetrical, trachea midline and thyroid not enlarged, symmetric, no tenderness/mass/nodules  Back: symmetric, no curvature. " ROM normal. No CVA tenderness.  Lungs: clear to auscultation bilaterally  Heart: regular rate and rhythm, S1, S2 normal, no murmur, click, rub or gallop  Abdomen: soft, non-tender; bowel sounds normal; no masses,  no organomegaly  Extremities: extremities normal, atraumatic, no cyanosis or edema  Pulses: 2+ and symmetric  Skin: Skin color, texture, turgor normal. No rashes or lesions  Lymph nodes: Cervical, supraclavicular, and axillary nodes normal.  Neurologic: Grossly normal    45 minutes spent together with the patient today, more than 50% spent in counseling, discussing the above topics.

## 2021-06-19 NOTE — PROGRESS NOTES
"Dear  Verna Stephenson, Cnp  [address] [place] [address]    Thank you for the opportunity to participate in the care of  Maddi Fam.    She is a 43 y.o. y/o who comes to the clinic to establish care for her narcolepsy with cataplexy.    The patient was diagnosed with narcolepsy with cataplexy in . I was able to find a copy of her initial PSG and MSLT. Her initial PSG was completed on 04 and it showed a normal AHI (no BLAKE). Sh completed an MSLT the following day (6/15/04) and this showed a mean latency of 6.4 minutes and 4 SOREMs. She has been taking Adderall 30 mg PO two times a day XR and 30 mg short acting two times a day. This yields a total dose of 120 mg per day but on most days but on some days, she takes a total dose of 90 mg.      The patient describes brief moments of cataplexy that she describes as brief episodes during which she feels that she is going to fall or he head is weak. She can have periods of several months without any episode of cataplexy and then she could have several weeks of cataplexy episodes that are present every night.    She is not taking naps because she is unable to wake up feeling refreshed after a short nap. If she takes, a nap, she needs to take a long nap or she will feel worse.     The patient also reports having leg discomfort in the evening. She describes this pain as an initial \"Charley Horse\" that does no progress well.     Past Medical History  Past Medical History:   Diagnosis Date     Hypertension      Hypokalemia      Migraine with aura and without status migrainosus      Narcolepsy and cataplexy         Past Surgical History  Past Surgical History:   Procedure Laterality Date     BREAST SURGERY  2000      SECTION, CLASSIC       TUBAL LIGATION          Meds  Current Outpatient Prescriptions   Medication Sig Dispense Refill     ADDERALL XR 30 mg 24 hr capsule Take 1 capsule (30 mg total) by mouth 2 (two) times a day. 60 " capsule 0     amLODIPine (NORVASC) 5 MG tablet Take 5 mg by mouth daily.       dextroamphetamine-amphetamine 30 mg Tab Take 30 mg by mouth 2 (two) times a day. 60 tablet 0     ferrous sulfate 325 (65 FE) MG tablet Take 325 mg by mouth.       hydrocortisone 2.5 % ointment Apply to rash TID x 7d       lisinopril-hydrochlorothiazide (PRINZIDE,ZESTORETIC) 20-25 mg per tablet Take 1 tablet by mouth daily.       multivit-mins-ferrous gluconat (MULTIVITAMIN WITH MINERALS) 9 mg iron/15 mL Liqd Take by mouth.       potassium chloride (K-DUR,KLOR-CON) 20 MEQ tablet Take 20 mEq by mouth daily.       sulfamethoxazole-trimethoprim (SEPTRA DS) 800-160 mg per tablet Take by mouth.       SUMAtriptan (IMITREX) 100 MG tablet Take 100 mg by mouth as needed.       tiZANidine (ZANAFLEX) 2 MG tablet Take 1 tablet by mouth as needed.       topiramate (TOPAMAX) 100 MG tablet Take 100 mg by mouth 2 (two) times a day.       No current facility-administered medications for this visit.         Allergies  Erythromycin and Minocycline     Social History  Social History     Social History     Marital status:      Spouse name: N/A     Number of children: N/A     Years of education: N/A     Occupational History     Not on file.     Social History Main Topics     Smoking status: Current Every Day Smoker     Packs/day: 0.50     Smokeless tobacco: Never Used      Comment: trying to quit     Alcohol use No     Drug use: No     Sexual activity: Yes     Partners: Male     Other Topics Concern     Not on file     Social History Narrative        Family History  Family History   Problem Relation Age of Onset     Hypertension Mother      Dementia Father      Breast cancer Neg Hx      Colon cancer Neg Hx      Diabetes Neg Hx      Stroke Neg Hx      Heart disease Neg Hx            Review of Systems:  Constitutional: Negative except as noted in HPI.   Eyes: Negative except as noted in HPI.   ENT: Negative except as noted in HPI.   Cardiovascular:  "Negative except as noted in HPI.   Respiratory: Negative except as noted in HPI.   Gastrointestinal: Negative except as noted in HPI.   Genitourinary: Negative except as noted in HPI.   Musculoskeletal: Negative except as noted in HPI.   Integumentary: Negative except as noted in HPI.   Neurological: Negative except as noted in HPI.   Psychiatric: Negative except as noted in HPI.   Endocrine: Negative except as noted in HPI.   Hematologic/Lymphatic: Negative except as noted in HPI.      Physical Exam:  /80  Pulse 68  Ht 5' 6.5\" (1.689 m)  Wt 166 lb (75.3 kg)  SpO2 100%  BMI 26.39 kg/m2  BMI:Body mass index is 26.39 kg/(m^2).   GEN: NAD.  Head: Normocephalic.  EYES: PERRLA, EOMI  ENT: Oropharynx is clear, Mallampatti class IV airway. Uvula is not edematous.  Nasal mucosa is pink  Neck : Thyroid is not palpable  CV: Regular rate and rhythm, S1 & S2 are normal. No murmurs  LUNGS: clear to auscultation bilaterally, no wheezes  MUSCULOSKELETAL: no clubbing, cyanosis or edema  SKIN: warm, dry, no rashes  Neurological: Alert, oriented to time, place, and person.  Psych:  normal mood, normal affect     Labs/Studies:       Chemistry        Component Value Date/Time     06/07/2018 1122    K 3.5 06/07/2018 1122     06/07/2018 1122    CO2 21 (L) 06/07/2018 1122    BUN 13 06/07/2018 1122    CREATININE 1.01 06/07/2018 1122     06/07/2018 1122        Component Value Date/Time    CALCIUM 10.5 06/07/2018 1122              Assessment and Plan:  In summary Maddi Fam is a 43 y.o. year old female here for consulation.  1. Narcolepsy with cataplexy.  Patient is well controlled with her current dose of ADDERALL but her dose if very high.  I reviewed her PSG results and MSLT from 2004 and the test seems to be very clear for narcolepsy. The fact that the patient has cataplexy is very specific to her diagnosis and reinforces the validity of her MSLT.  I wrote a prescription for her narcolepsy " medications.  We had an extensive conversation about narcolepsy in general and my philosophy of care. Overall, I would like to decrease her total dose of stimulants since this could cause side effects in the long term. One of my main concerns with her current dose is that fact that she has a diagnosis of hypertension and stimulants are known to increase BP levels. We reviewed some of the basic rules associated with controlled substances and the fact that she should monitor her tablets.   She was wondering if there is a benefit in trying sodium oxybate again but I do not think that this would be a good idea given the presence of HTN.  I explained to the patient that Baclofen is another member of th MK Beta agonists and while it has not been tested for narcolepsy, we have other patients who noticed an improvement in daytime symptoms with that medication. I  Explained to the patient that this would be an off label use of the medication but it should be relatively safe.  I encouraged the patient to increase her daily naps.    2. Restless legs symptoms.  The frequency is low  We will order ferritin levels.         Patient verbalized understanding of these issues, agrees with the plan and all questions were answered today. Patient was given an opportuntity to voice any other symptoms or concerns not listed above. Patient did not have any other symptoms or concerns.      Patient told to return in 6 weeks.     MD BERNICE Matthews Board Certified in Internal Medicine and Sleep Medicine  [facility].

## 2021-06-19 NOTE — PROGRESS NOTES
ASSESSMENT & PLAN:  1. Frequent headaches  Referral to neurological Associates for frequent headaches.  This had been discussed with her PCP at last visit but she was not ready for referral.  She would like that now so referral was placed.  I suspect her current increase in headaches over the last week is related to her illness, recent sinus symptoms and now likely acute bronchitis.  - Ambulatory referral to Neurology    2. Narcolepsy with cataplexy  Standing orders by Dr. Brown will be drawn today  - Ferritin    3. Acute bronchitis  Lung exam is reassuring today.  Given her symptoms ongoing for a week without improvement, given her smoking status, recommend empiric antibody coverage with azithromycin.  She states she has tolerated this medication in the past.  Z-Chester prescribed.  Discussed signs or symptoms for which to be reevaluated.  Tessalon Perles as needed for cough.  With blood pressure elevation try to avoid over-the-counter cough and cold as well as sinus medications, and she was advised to stop taking over-the-counter caffeine supplement.    4.  Elevated blood pressure  Likely accommodation of stopping her antihypertensives, acute illness, and over-the-counter cough, cold, sinus preparations for her current illness.  Recommend she avoid these over-the-counter medications and restart her antihypertensives.  She states she has a blood pressure cuff at home she is not currently using, advised her to start checking and follow-up if not improving.    There are no Patient Instructions on file for this visit.    Orders Placed This Encounter   Procedures     Ambulatory referral to Neurology     Referral Priority:   Routine     Referral Type:   Consultation     Referral Reason:   Evaluation and Treatment     Requested Specialty:   Neurology     Number of Visits Requested:   1     There are no discontinued medications.    No Follow-up on file.    CHIEF COMPLAINT:  Chief Complaint   Patient presents with     Cough      "Chest congestion, worse at HS., wheezing and SOB.  Sx for one week.  Headache for last 3 days.  Muscle pains.     Hypertension     Stopped BP Meds 2 weeks ago.       HISTORY OF PRESENT ILLNESS:  Maddi is a 43 y.o. female presenting to the clinic today for cough and chest congestion, occasional wheezing and shortness of breath.  Worse at night.  She reports symptoms started with a dry cough over a week ago, then progressed to having sinus pressure and pain, subjective fevers and chills.  She felt like she was improving from those symptoms about a week ago when she started to then have chest congestion, deeper cough, feeling short of breath.  No further fevers.  She is a smoker.  No history of asthma by her report.  States she has been given albuterol inhalers in the past but has not felt that they were helpful.  She has been having increased headaches and so has been taking naproxen.  Also took an over-the-counter dextromethorphan cough medication, caffeine supplements for the headaches, and took \"sinus max\" yesterday evening.    Blood pressure is elevated today.  States she has not been taking her amlodipine or lisinopril-hydrochlorothiazide for the last 3 weeks.  States she was actually off of these medications when she saw Dr. Brown and blood pressure was normal.  She did not mention to him at that visit that she had stopped her antihypertensives.  She stopped them because several weeks ago she was feeling dizzy, fatigued, run down.  A week after stopping her medications she felt great, without any of those symptoms.  As above, taking multiple medications that could cause increased to her blood pressure with this illness.      REVIEW OF SYSTEMS:   All other systems are negative.  She previously followed with neurology and she is interested in establishing care with a new neurologist.  This was discussed at her last visit here but she was not ready for referral yet.  She is now ready.    [facility]:  Reviewed in her " "chart    TOBACCO USE:  History   Smoking Status     Current Every Day Smoker     Packs/day: 0.50   Smokeless Tobacco     Never Used     Comment: trying to quit       VITALS:  Vitals:    07/26/18 0947 07/26/18 1008   BP: (!) 160/100 150/90   Patient Site: Left Arm    Patient Position: Sitting    Cuff Size: Adult Regular    Pulse: 72    Resp: 16    Temp: 98.5  F (36.9  C)    TempSrc: Oral    SpO2: 100%    Weight: 164 lb 6 oz (74.6 kg)    Height: 5' 6.5\" (1.689 m)      Wt Readings from Last 3 Encounters:   07/26/18 164 lb 6 oz (74.6 kg)   07/06/18 166 lb (75.3 kg)   06/07/18 158 lb 3 oz (71.8 kg)     Body mass index is 26.13 kg/(m^2).    PHYSICAL EXAM:  GENERAL: Pleasant, well-appearing patient in no acute distress.   HEENT: Pupils equal round reactive to light. Sclerae and conjunctivae clear. Oropharynx is clear with moist mucous membranes.  TMs clear bilaterally.  NECK: Supple without lymphadenopathy, no carotid bruits   CARDIOVASCULAR: Heart regular rate and rhythm without murmur normal S1-S2   ABDOMEN: Soft, nontender, nondistended.  No guarding or rebound.  No organomegaly or masses appreciated.  LUNGS: Clear to auscultation bilaterally, good air movement throughout.  Initially, she has a faint expiratory wheeze in the upper left lung which clears with subsequent breaths.  No crackles.  EXTREMITIES Warm and well-perfused without edema.  NEURO: Alert and oriented. Grossly nonfocal.   PSYCHIATRIC: Presents on time and well groomed. Normal speech and thought content. Full affect. No abnormal movements or behaviors noted.          MEDICATIONS:  Current Outpatient Prescriptions   Medication Sig Dispense Refill     ADDERALL XR 30 mg 24 hr capsule Take 1 capsule (30 mg total) by mouth 2 (two) times a day. 60 capsule 0     baclofen (LIORESAL) 10 MG tablet Take 2 tablets (20 mg total) by mouth at bedtime. 60 tablet 3     dextroamphetamine-amphetamine 30 mg Tab Take 30 mg by mouth 2 (two) times a day. 60 tablet 0     " SUMAtriptan (IMITREX) 100 MG tablet Take 100 mg by mouth as needed.       tiZANidine (ZANAFLEX) 2 MG tablet Take 1 tablet by mouth as needed.       topiramate (TOPAMAX) 100 MG tablet Take 100 mg by mouth 2 (two) times a day.       amLODIPine (NORVASC) 5 MG tablet Take 5 mg by mouth daily.       azithromycin (ZITHROMAX) 250 MG tablet 2 tablets (500 mg) on day 1, then 1 tablet daily (250 mg) daily on days 2 through 5 6 tablet 0     benzonatate (TESSALON PERLES) 100 MG capsule Take 1 capsule (100 mg total) by mouth every 6 (six) hours as needed for cough. 30 capsule 0     ferrous sulfate 325 (65 FE) MG tablet Take 325 mg by mouth.       hydrocortisone 2.5 % ointment Apply to rash TID x 7d       lisinopril-hydrochlorothiazide (PRINZIDE,ZESTORETIC) 20-25 mg per tablet Take 1 tablet by mouth daily.       multivit-mins-ferrous gluconat (MULTIVITAMIN WITH MINERALS) 9 mg iron/15 mL Liqd Take by mouth.       potassium chloride (K-DUR,KLOR-CON) 20 MEQ tablet Take 20 mEq by mouth daily.       sulfamethoxazole-trimethoprim (SEPTRA DS) 800-160 mg per tablet Take by mouth.       No current facility-administered medications for this visit.

## 2021-06-19 NOTE — LETTER
Letter by Verna Stephenson CNP at      Author: Verna Stephenson CNP Service: -- Author Type: --    Filed:  Encounter Date: 5/14/2019 Status: (Other)         Maddi Fam  [address]  [facility] [address]      May 16, 2019      Dear Maddi    In reviewing your records, we have determined a gap in your preventive services. Based on your age and health history, we recommend the follow service.     ? General Physical  ? Physical with a Pap Smear  ? Colon cancer screening  ? Mammogram  ? Immunization  ? Diabetic check  ? Blood pressure/cardiovascular check  ? Asthma check  ? Cholesterol test  ? Lab work  ? Med check      If you have had the service elsewhere, please contact us so we can update our records. Please let us know if you have transferred your care to another clinic.    Please call [phone] to schedule this appointment.    We believe that a strong preventive care program, including regular physicals and follow-up care is an important part of a healthy lifestyle and we are committed to helping you maintain your health.    Thank you for choosing us as your health care provider.    Sincerely,     [facility]

## 2021-06-20 NOTE — PROGRESS NOTES
Prior Authorization has been started today 10/11/18 with Pending sale to [facility] for Rx D-Amphetamine salt combo 30 mg tablet, Take 1 Tablet by mouth 2 times daily. There phone number is [phone].

## 2021-06-20 NOTE — PROGRESS NOTES
"Assessment / Impression     1. URI (upper respiratory infection)         Plan:     1.  Symptomatic cares were reviewed with the patient.  She was given a note for work to remain out of work today and tomorrow.  Parameters for which to return for further evaluation were reviewed.    Subjective:      HPI: Maddi Fam is a 43 y.o. female with 4 days of cough, runny nose, fatigue and congestion is here today for evaluation.  Patient reports she is living with her grandmother who had a cold over the weekend and now since Sunday she has had a nonproductive cough, runny nose, and head congestion.  She denies any fevers.  She states she has been trying to sleep but went into work on Monday.  She reports she has been trying occasional Tylenol with no relief.  She denies any shortness of breath or known sick contacts other than her grandma.  She denies any significant sore throat.  She states she felt a little sick to her stomach when she had some fluids this morning but is otherwise been able to eat and drink normally.  She denies any abdominal pain.  Her boyfriend who is with her today has had similar symptoms.      Review of Systems  Pertinent items are noted in HPI.       Objective:     /80  Pulse 79  Temp 98.4  F (36.9  C)  Ht 5' 5.5\" (1.664 m)  Wt 155 lb (70.3 kg)  SpO2 100%  BMI 25.4 kg/m2  Physical Examination: General appearance - alert, well appearing, and in no distress  Eyes: pupils equal and reactive, extraocular eye movements intact, noninjected conjunctiva.  Ears: Both TMs are without erythema and are intact.  Face: Bilateral fullness to palpation over her maxillary sinuses but nontender.  Nose: Significant clear rhinorrhea is noted bilaterally without nasal mucosa erythema  Mouth: mucous membranes moist, pharynx normal without lesions, no erythema  Neck: supple, no significant adenopathy  Lymphatics: no palpable lymphadenopathy  Chest: clear to auscultation, no wheezes, rales or rhonchi, " symmetric air entry  Heart: normal rate, regular rhythm, normal S1, S2, no murmurs, rubs, clicks or gallops  Abdomen: soft, nontender, nondistended, no masses or organomegaly

## 2021-06-22 RX ORDER — DEXTROAMPHETAMINE SACCHARATE, AMPHETAMINE ASPARTATE MONOHYDRATE, DEXTROAMPHETAMINE SULFATE AND AMPHETAMINE SULFATE 7.5; 7.5; 7.5; 7.5 MG/1; MG/1; MG/1; MG/1
30 CAPSULE, EXTENDED RELEASE ORAL 2 TIMES DAILY
Qty: 60 CAPSULE | Refills: 0 | Status: SHIPPED | OUTPATIENT
Start: 2021-06-22 | End: 2021-08-23

## 2021-06-22 RX ORDER — DEXTROAMPHETAMINE SACCHARATE, AMPHETAMINE ASPARTATE, DEXTROAMPHETAMINE SULFATE AND AMPHETAMINE SULFATE 7.5; 7.5; 7.5; 7.5 MG/1; MG/1; MG/1; MG/1
30 TABLET ORAL 2 TIMES DAILY
Qty: 60 TABLET | Refills: 0 | Status: SHIPPED | OUTPATIENT
Start: 2021-06-22 | End: 2021-08-23

## 2021-06-22 NOTE — TELEPHONE ENCOUNTER
Refilled Medications  Patient needs follow-up visit before more refills  If there are no visits in next month then can use admin time on a Tuesday or Thursday

## 2021-06-22 NOTE — PROGRESS NOTES
Patient returned call.    Subjective measures:   Patient reports she has been struggling with a cut in her nose causing discomfort using the device.  She has tried a different style of mask, however she was not able to get alternative  mask to seal.     Assessment: Pt not meeting objective benchmarks for compliance  Patient failing following subjective benchmarks: soreness    Action plan:pt to have 30 day [facility] visit.  Pt to continue acclimate to using the device and will try using a nasal saline gel for comfort as the cut in her nose heals       Total time spent counseling, coaching  and reviewing PAP therapy data with patient  4minutes     [id number] (this call)    [id number] no (previous call)

## 2021-06-23 NOTE — TELEPHONE ENCOUNTER
Mailing records to patient.  [employer] testing showed COMT genotype is associated with reduced therapeutic response to this drug:  amphetamine salts  dexmethylphenidate  Dextroamphetamine  lisdexamfetamine  Methylphenidate    Drug effect is lower but metabolism is normal. She is likely to have less effect at normal doses and at higher doses she runs the risk of side effects as she isn't a rapid metabolizer.    Luis Enrique Whitman MD  1:45 PM, 2/3/2019

## 2021-06-23 NOTE — TELEPHONE ENCOUNTER
Reviewed results per other notes.  Discussed consideration of lowering Adderall dose and addition of other medications (Modafinil, Armodafinil or Xyrem).  May consider trial for Pitolisant in the future.    If she'd like to try Xyrem recommend f2f, call Monday to schedule.    Luis Enrique Whitman MD  5:25 PM, 2/8/2019

## 2021-06-23 NOTE — TELEPHONE ENCOUNTER
Dr Whitman,      Patient called to get results; if unable to speak to her today she requested results in My Chart.    Thank you.    Contact:  [phone]

## 2021-06-23 NOTE — TELEPHONE ENCOUNTER
Dr. J Carlos Aviles had us change Gayleen to your schedule because she needs a more extensive appointment. We called her, and she states that she has been completely out of her medications since 1/17/19. She states that she needs these medications in order to function. The soonest that I could get her in was 1/30/19 at 2:20pm. She asked if I could write you a message asking if you could be able to refill her Adderall and her Dextroamphetamine-amphetamine before her appointment, and that she promises that she will be there for her appointment. I told her that I can certainly write a message to you, but I can't make a promise that you would be able to refill these before her appointment. If you have any questions, you can reach her at [phone]. Thank you.

## 2021-06-23 NOTE — PROGRESS NOTES
Dear  Sachin, Verna Manning, Cnp  [address] [place] [address]    Thank you for the opportunity to participate in the care of  Maddi Fam.    She is a 44 y.o. y/o who comes to the clinic for Narcolepsy with Cataplexy.  She is a transfer of care from Dr. Brown.       First I wanted to introduce myself as the new medical director for the [facility].  I can completely understand your frustration regarding your Narcolepsy management and I want you to know we are committed to working with you to help you sleep better and stay awake.  I'll be signing off on your one-time refill today.       The main issue coming up is that you are much, much higher doses of Adderall than is normal.  Per the records at [facility] under Dr. Woodson, he was concerned regarding severe sleep deprivation that was exacerbating your Narcolepsy, and that was when you were only on 90 mg per day of Adderall.  Dr. Brown documented his concern at the higher dose, Dr. Parikh has expressed his concern, and I am also concerned about the amount of Adderall you are on.       I have extensive experience with management of Narcolepsy and to be honest I'm not sure how you ended up on such a high dose of medication but there are real dangers to being on such a high dose.  We are not concerned about drug abuse but more about safe management of your condition.  When we meet on Friday, I'll want to review the conditions of your dose changes over time and come up with a plan to safely manage your Narcolepsy.  That is the best we can offer and I hope it is acceptable.     She describes her cataplexy as feeling like her medication is not working and her body is shutting down.  Then laughter, or emotion or tiredness brings it on.  Then she gets feelings of extreme sleepiness and sleep attack. TO BE CLEAR THESE ARE NOT CATAPLEXY; THESE MAY BE SLEEP ATTACKS.  Events may be due to withdrawal of Adderall, sleep deprivation, or Narcolepsy.   "    Currently working at [employer] in [place], [place] as .  Typically working 7 - 3:30 PM.  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  Usually in bed around 9:30 - 10 PM.  Falls asleep fairly quickly.  Up 2 - 3 times per night due to nocturia or waking with headaches.  Alarm goes at 5:25 and 5:35 AM; cecy' gets up at 6 AM.      Usually taking short acting 30 mg Adderall at 6 - 6:10 AM to \"get to work.\"  Has first break at 9:30 AM and may take long-acting between 8 - 9 or 9:30 AM.  Second short acting at 11:30 AM and last long acting at 1 PM.      Has been taking 100 mg of Topamax in the AM instead of 100 mg two times a day or 100 mg HS.      Past Medical History  Past Medical History:   Diagnosis Date     Hypertension      Hypokalemia      Migraine with aura and without status migrainosus      Narcolepsy and cataplexy         Past Surgical History  Past Surgical History:   Procedure Laterality Date     BREAST SURGERY        SECTION, CLASSIC       TUBAL LIGATION          Meds  Current Outpatient Medications   Medication Sig Dispense Refill     ADDERALL XR 30 mg 24 hr capsule Take 1 capsule (30 mg total) by mouth 2 (two) times a day. 60 capsule 0     amLODIPine (NORVASC) 5 MG tablet Take 5 mg by mouth daily.       dextroamphetamine-amphetamine 30 mg Tab Take 30 mg by mouth 2 (two) times a day. 60 tablet 0     lisinopril-hydrochlorothiazide (PRINZIDE,ZESTORETIC) 20-25 mg per tablet Take 1 tablet by mouth daily.       potassium chloride (K-DUR,KLOR-CON) 20 MEQ tablet Take 20 mEq by mouth daily.       SUMAtriptan (IMITREX) 100 MG tablet Take 100 mg by mouth as needed.       topiramate (TOPAMAX) 100 MG tablet Take 100 mg by mouth 2 (two) times a day.       No current facility-administered medications for this visit.         Allergies  Erythromycin and Minocycline     Social History  Social History     Socioeconomic History     Marital " "status:      Spouse name: Not on file     Number of children: Not on file     Years of education: Not on file     Highest education level: Not on file   Social Needs     Financial resource strain: Not on file     Food insecurity - worry: Not on file     Food insecurity - inability: Not on file     Transportation needs - medical: Not on file     Transportation needs - non-medical: Not on file   Occupational History     Not on file   Tobacco Use     Smoking status: Current Every Day Smoker     Packs/day: 0.50     Smokeless tobacco: Never Used     Tobacco comment: trying to quit   Substance and Sexual Activity     Alcohol use: No     Drug use: No     Sexual activity: Yes     Partners: Male   Other Topics Concern     Not on file   Social History Narrative     Not on file        Family History  Family History   Problem Relation Age of Onset     Hypertension Mother      Dementia Father      Breast cancer Neg Hx      Colon cancer Neg Hx      Diabetes Neg Hx      Stroke Neg Hx      Heart disease Neg Hx      Review of Systems:  Constitutional: Negative except as noted in HPI.   Eyes: Negative except as noted in HPI.   ENT: Negative except as noted in HPI.   Cardiovascular: Negative except as noted in HPI.   Respiratory: Negative except as noted in HPI.   Gastrointestinal: Negative except as noted in HPI.   Genitourinary: Negative except as noted in HPI.   Musculoskeletal: Negative except as noted in HPI.   Integumentary: Negative except as noted in HPI.   Neurological: Negative except as noted in HPI.   Psychiatric: Negative except as noted in HPI.   Endocrine: Negative except as noted in HPI.   Hematologic/Lymphatic: Negative except as noted in HPI.      Physical Exam:  /70   Pulse 84   Ht 5' 7\" (1.702 m)   Wt 165 lb 8 oz (75.1 kg)   SpO2 100%   BMI 25.92 kg/m       Labs/Studies:     No results found for: WBC, HGB, HCT, MCV, PLT      Chemistry        Component Value Date/Time     06/07/2018 1122    " K 3.5 06/07/2018 1122     06/07/2018 1122    CO2 21 (L) 06/07/2018 1122    BUN 13 06/07/2018 1122    CREATININE 1.01 06/07/2018 1122     06/07/2018 1122        Component Value Date/Time    CALCIUM 10.5 06/07/2018 1122            Lab Results   Component Value Date    FERRITIN 16 07/26/2018     No results found for: TSH  No results found for: HGBA1C      Assessment and Plan:  1. Narcolepsy without cataplexy  Concerns over increased dose.    IF Genesight suggests poor response or increased metabolismm consider alternative plans.  Recommend finding headache specialist and get alternative to Topamax.  Consider Xyrem retry or Pitolisant trial in the future.    We spent a total of 40 minutes of face-to-face encounter and more than 50% of the encounter was used for counseling or coordination of care.      Luis Enrique Whitman MD  3:13 PM, 1/25/2019

## 2021-06-23 NOTE — TELEPHONE ENCOUNTER
Patient canceled visit today due to polar vortex weather concerns.    LVM to discuss Genesight results.  Genesight testing 1/29/2019  COMT genotype is associated with reduced therapeutic response to this drug: Amphetamine salts, dexmethylphendiate, dextroamphetamine, lisdexamfetamine, methylphenidate  (Reduced response but not necessarily reduced side effects).    Consider alternatives or repeat trial of modafinil/armodafinil.    Luis Enrique Whitman MD  1:44 PM, 1/30/2019

## 2021-06-23 NOTE — TELEPHONE ENCOUNTER
Dr Whitman    Patient returned call regarding test results.    Patient at home all day today.    Contact:  [phone]    Thank you.

## 2021-06-23 NOTE — TELEPHONE ENCOUNTER
I've sent patient an extensive [employer] message and approved medication requests.    She needs to be seen to have an appropriate discussion given medication concerns previously documented.  Luis Enrique Whitman MD  4:22 PM, 1/23/2019

## 2021-06-24 NOTE — TELEPHONE ENCOUNTER
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 So she is calling wondering if she could get a refill before the weekend because she is not going to have enough for over the weekend. If you would be able to give her a call at [phone] that would be great. Thank you.

## 2021-06-24 NOTE — PROGRESS NOTES
Narcolepsy/Medication Management follow up.  See my note from 1/25/2019 for details of patient history  Has cut topamax tables in half and only taking 50 mg HS and reports she is feeling less groggy without increase in headaches.  We re-reviewed Genesight testing.    Since last visit has been setting alarm for 4 AM because she is getting to work around 6 AM for an extra hour of mandatory overtime.  Still usually going to bed around 10 PM.    Has previously tried modafinil, Xyrem and methylphenidate.    Assessment and Plan:  1. Narcolepsy without cataplexy  Will see me in [place] for evaluation for Pitolisant trial.  To schedule next Tuesday for evaluation and review of consent.      We spent a total of 15 minutes of face-to-face encounter and more than 50% of the encounter was used for counseling or coordination of care.

## 2021-06-25 NOTE — TELEPHONE ENCOUNTER
ADDERALL XR 30 mg 24 hr capsule  30 mg, Oral, 2 times daily  EditCancel Reorder          Summary: Take 1 capsule (30 mg total) by mouth 2 (two) times a day., Starting Fri 2/22/2019, Normal   Dose, Frequency: 30 mg, 2 times daily  Start: 2/22/2019  Ord/Sold: 2/22/2019 (O)  Report  Adh:   Taking:   Long-term:   Pharmacy: [facility] [address] [place] [address], [place] - [address] [facility] AT [address] [place] [address]  Med Dose History       Dispense as written       Patient Sig: Take 1 capsule (30 mg total) by mouth 2 (two) times a day.       Ordered on: 2/22/2019       Authorized by: MARILEE CUETO       Dispense: 60 capsule       Refills: 0 ordered        [facility] [address] [place] [address], [place] - [address] [facility] AT [address] [place] [address]  [phone]

## 2021-06-25 NOTE — TELEPHONE ENCOUNTER
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 Would you be able to give her a call back at [phone]? Thank you.

## 2021-07-02 ENCOUNTER — DOCUMENTATION ONLY (OUTPATIENT)
Dept: SLEEP MEDICINE | Facility: CLINIC | Age: 46
End: 2021-07-02

## 2021-07-02 DIAGNOSIS — F51.04 CHRONIC INSOMNIA: ICD-10-CM

## 2021-07-02 DIAGNOSIS — G47.33 OSA (OBSTRUCTIVE SLEEP APNEA): ICD-10-CM

## 2021-07-02 DIAGNOSIS — G47.419 NARCOLEPSY WITHOUT CATAPLEXY(347.00): ICD-10-CM

## 2021-07-02 NOTE — PROGRESS NOTES
30 DAY [facility] VISIT    Diagnostic AHI: 12.9  PSG    Message left for patient to return call     Assessment: Pt not meeting objective benchmarks for compliance     Action plan: waiting for patient to return call.   Patient has scheduled a follow up visit with Dr. Melgoza on 9/2/21.   Device type: Auto-CPAP  PAP settings: CPAP min 6.0 cm  H20     CPAP max 15.0 cm  H20    95th% pressure 9.5 cm  H20      RESMED EPR level Setting: TWO    RESMED Soft response setting:  OFF  Mask type:  Nasal Pillows  Objective measures: 14 day rolling measures      Compliance  0 %      Leak  6.4 lpm  last  upload      AHI 3.7   last  upload      Average number of minutes 5      Objective measure goal  Compliance   Goal >70%  Leak   Goal < 24 lpm  AHI  Goal < 5  Usage  Goal >240        Total time spent on accessing and interpreting remote patient PAP therapy data  0 minutes    Total time spent counseling, coaching  and reviewing PAP therapy data with patient  0 minutes     [id number] this call  [id number] no  at 3 or 14 day [facility]

## 2021-08-20 ENCOUNTER — MYC MEDICAL ADVICE (OUTPATIENT)
Dept: SLEEP MEDICINE | Facility: CLINIC | Age: 46
End: 2021-08-20

## 2021-08-20 DIAGNOSIS — G47.419 NARCOLEPSY WITHOUT CATAPLEXY(347.00): ICD-10-CM

## 2021-08-23 RX ORDER — DEXTROAMPHETAMINE SACCHARATE, AMPHETAMINE ASPARTATE MONOHYDRATE, DEXTROAMPHETAMINE SULFATE AND AMPHETAMINE SULFATE 7.5; 7.5; 7.5; 7.5 MG/1; MG/1; MG/1; MG/1
30 CAPSULE, EXTENDED RELEASE ORAL 2 TIMES DAILY
Qty: 60 CAPSULE | Refills: 0 | Status: SHIPPED | OUTPATIENT
Start: 2021-08-23 | End: 2021-10-27

## 2021-08-23 RX ORDER — DEXTROAMPHETAMINE SACCHARATE, AMPHETAMINE ASPARTATE, DEXTROAMPHETAMINE SULFATE AND AMPHETAMINE SULFATE 7.5; 7.5; 7.5; 7.5 MG/1; MG/1; MG/1; MG/1
30 TABLET ORAL 2 TIMES DAILY
Qty: 60 TABLET | Refills: 0 | Status: SHIPPED | OUTPATIENT
Start: 2021-08-23 | End: 2021-10-27

## 2021-08-23 NOTE — TELEPHONE ENCOUNTER
Pending Prescriptions:                       Disp   Refills    amphetamine-dextroamphetamine (ADDERALL X*60 cap*0            Sig: Take 1 capsule (30 mg) by mouth 2 times daily    amphetamine-dextroamphetamine (ADDERALL) *60 tab*0            Sig: Take 1 tablet (30 mg) by mouth 2 times daily        Last Written Prescription Date:  06/22/2021  Last Fill Quantity: 60,   # refills: 0  Last Office Visit: 05/04/2021  Future Office visit:   09/02/2021    Routing refill request to provider for review/approval because:  Drug not on the [facility], [facility] or [facility] refill protocol or controlled substance

## 2021-09-19 ENCOUNTER — HEALTH MAINTENANCE LETTER (OUTPATIENT)
Age: 46
End: 2021-09-19

## 2021-12-09 DIAGNOSIS — G47.419 NARCOLEPSY WITHOUT CATAPLEXY(347.00): ICD-10-CM

## 2021-12-09 RX ORDER — DEXTROAMPHETAMINE SACCHARATE, AMPHETAMINE ASPARTATE, DEXTROAMPHETAMINE SULFATE AND AMPHETAMINE SULFATE 7.5; 7.5; 7.5; 7.5 MG/1; MG/1; MG/1; MG/1
30 TABLET ORAL 2 TIMES DAILY
Qty: 60 TABLET | Refills: 0 | Status: SHIPPED | OUTPATIENT
Start: 2021-12-09 | End: 2022-01-21

## 2021-12-09 RX ORDER — DEXTROAMPHETAMINE SACCHARATE, AMPHETAMINE ASPARTATE MONOHYDRATE, DEXTROAMPHETAMINE SULFATE AND AMPHETAMINE SULFATE 7.5; 7.5; 7.5; 7.5 MG/1; MG/1; MG/1; MG/1
30 CAPSULE, EXTENDED RELEASE ORAL 2 TIMES DAILY
Qty: 60 CAPSULE | Refills: 0 | Status: SHIPPED | OUTPATIENT
Start: 2021-12-09 | End: 2022-01-21

## 2021-12-09 NOTE — TELEPHONE ENCOUNTER
Pending Prescriptions:                       Disp   Refills    amphetamine-dextroamphetamine (ADDERALL X*60 cap*0            Sig: Take 1 capsule (30 mg) by mouth 2 times daily    amphetamine-dextroamphetamine (ADDERALL) *60 tab*0            Sig: Take 1 tablet (30 mg) by mouth 2 times daily          Last Written Prescription Date:  10/27/2021  Last Fill Quantity: 60,   # refills: 0  Last Office Visit: 05/04/20021  Future Office visit:       Routing refill request to provider for review/approval because:  Drug not on the [facility], [facility] or [facility] refill protocol or controlled substance

## 2021-12-09 NOTE — TELEPHONE ENCOUNTER
Reason for call:  Medication   If this is a refill request, has the caller requested the refill from the pharmacy already? No  Will the patient be using a [facility]? Yes  Name of the pharmacy and phone number for the current request: [facility]     Name of the medication requested:    amphetamine-dextroamphetamine (ADDERALL XR) 30 MG  amphetamine-dextroamphetamine (ADDERALL) 30 MG     Other request: n/a    Phone number to reach patient:  Home number on file [phone] (home)    Best Time: pt declined call back, unless issue with refill    Can we leave a detailed message on this number?  NO    Travel screening: Not Applicable

## 2022-01-09 ENCOUNTER — HEALTH MAINTENANCE LETTER (OUTPATIENT)
Age: 47
End: 2022-01-09

## 2022-01-20 ENCOUNTER — TELEPHONE (OUTPATIENT)
Dept: SLEEP MEDICINE | Facility: CLINIC | Age: 47
End: 2022-01-20
Payer: COMMERCIAL

## 2022-01-20 NOTE — TELEPHONE ENCOUNTER
Reason for Call:  Other prescription    Detailed comments: patient called and will be out of medications Adderall XR 30 MG 2x/day and Adderall short acting 30 MG 2x/day by Monday.  Would like to pickup at [facility].  Please contact patient.  Thank you.    Phone Number Patient can be reached at: Home number on file [phone] (home)    Best Time: any    Can we leave a detailed message on this number? YES    Call taken on 1/20/2022 at 2:57 PM by Yisel Lopez

## 2022-01-21 DIAGNOSIS — G47.419 NARCOLEPSY WITHOUT CATAPLEXY(347.00): ICD-10-CM

## 2022-01-21 DIAGNOSIS — F51.04 CHRONIC INSOMNIA: ICD-10-CM

## 2022-01-21 DIAGNOSIS — G47.33 OSA (OBSTRUCTIVE SLEEP APNEA): ICD-10-CM

## 2022-01-21 RX ORDER — DEXTROAMPHETAMINE SACCHARATE, AMPHETAMINE ASPARTATE, DEXTROAMPHETAMINE SULFATE AND AMPHETAMINE SULFATE 7.5; 7.5; 7.5; 7.5 MG/1; MG/1; MG/1; MG/1
30 TABLET ORAL 2 TIMES DAILY
Qty: 60 TABLET | Refills: 0 | Status: SHIPPED | OUTPATIENT
Start: 2022-01-21 | End: 2022-02-09

## 2022-01-21 RX ORDER — DEXTROAMPHETAMINE SACCHARATE, AMPHETAMINE ASPARTATE MONOHYDRATE, DEXTROAMPHETAMINE SULFATE AND AMPHETAMINE SULFATE 7.5; 7.5; 7.5; 7.5 MG/1; MG/1; MG/1; MG/1
30 CAPSULE, EXTENDED RELEASE ORAL 2 TIMES DAILY
Qty: 60 CAPSULE | Refills: 0 | Status: SHIPPED | OUTPATIENT
Start: 2022-01-21 | End: 2022-02-09 | Stop reason: ALTCHOICE

## 2022-01-21 NOTE — TELEPHONE ENCOUNTER
Chief Complaint   Patient presents with     Refill Request      Refill request received via      No prescriptions requested or ordered in this encounter         Last Written Prescription Date:  12/9/2021  Last Fill Quantity: 120,   # refills: 0  Last Office Visit with prescribing provider: 5/4/2021  Future Office visit: 2/9/2022      Routing refill request to provider for review/approval because:      Nai Carrillo LIGIA  [facility]

## 2022-01-21 NOTE — TELEPHONE ENCOUNTER
Refill encounter created and routed to Dr. Melgoza for review.       Nai Carrillo [facility]

## 2022-02-08 NOTE — PROGRESS NOTES
"Obstructive Sleep Apnea - PAP Follow-Up Visit:    Chief Complaint   Patient presents with     Sleep Problem     Narcolepsy, discontinued \"gave up on\" CPAP machine       Maddi Fam for follow-up of narcolepsy and mild obstructive sleep apnea     The patient was diagnosed with narcolepsy in 2004 at [facility]. Her initial PSG was completed in 2002 and did not show any sleep disordered breathing. She represented to Rajat Oswald at [facility] in 2004 and reported her sleepiness improved and the subsequently reoccurred. She has a second sleep study 6/14/04 (145#) that again showed a normal AHI (no BLAKE). She completed an MSLT the following day (6/15/04) and this showed a mean latency of 6.3 minutes and 4 SOREMs in 5 naps     There does not appear to be actigraphy, subjective history regarding total sleep time, or urine drug screen done in 2004. Likewise medication list from that time is not available.     Unclear if/where she was treated from [id number]     She says she was treated with Xyrem for a year in late 2000s, but has small children and  worked nights.     Patient was seen by Jerson Brown at [facility] 07/06/2018 to establish care for narcolepsy with cataplexy taking Adderall XR 30 mg PO two times a day XR 30 mg short acting two times a day (120 mg per day but on most days but on some days 90 mg). The patient described brief moments of cataplexy that occur in infrequent clusters. She described brief episodes during which she feels that she is going to fall or he head is weak.     Her care was transferred to Dr. Whitman 1/2019 where concerns regarding higher than normal doses of Adderall was expressed. She described her cataplexy as feeling like her medication is not working and her body is shutting down. Then laughter, or emotion or tiredness brings it on. Then she gets feelings of extreme sleepiness and sleep attack. It was felt that these episodes were sleep attacks NOT cataplexy.     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 to this drug: Amphetamine salts, dexmethylphendiate, dextroamphetamine, lisdexamfetamine, methylphenidate     In 2/2019 she entered into Pitolisant study, but it did not seem effective and was associated with weight gain     Care transferred to Jong Melgoza MD 4/8/2020     No unusual Rx in [place]  database 2/8/2022   She is receiving #30 10mg zolpidem tablets from [facility] monthly  Received 10 alprazolam from [facility] 2/1/2022     Given weight gain, snoring, sleep maintenance difficulties, long sleep times and the fact we are considering Xyrem we recommended a Sleep ApneaTest to rule out interval development of obstructive sleep apnea     Study Date: 4/27/2021 (195.0 lbs)  - Sleep was disrupted by arousals. Reduced REM sleep was noted. Dense spindle activity and alpha intrusions were noted.   Respiration: scored using 3% rules  - Apnea/hypopnea index was 12.9 events per hour (central apnea/hypopnea index was 0.5 events per hour). The REM AHI was 6.7 events per hour. The supine AHI was 12.0 events per hour. The RERA index was 5.6 events per hour.  The RDI was 18.5 events per hour.  - Lowest oxygen saturation was 88.0%. Time spent less than or equal to 88% was 0 minutes. Time spent less than or equal to 89% was 0 minutes.  - PLM index was 0.7 movements per hour. The PLM Arousal Index was 0.4 per hour.      I have not seen her since 11/2020    She did not tolerate CPAP  Because she woke up feeling suffocated after 3-4 hours. She woukd take it off she tried using for 1 month    Her PAP interface is Nasal Mask.      Total score - [place]: 16 (2/9/2022  1:00 PM)  LAZARUS Total Score: 21      Overall, the patient reports they are doing fair.    Patient is not working.     During work days bedtime is typically 10. Usually it takes about 30-60 minutes to fall asleep. They are typically getting out of bed at 8 AM.  Wakes 2-3 night darryl has  No  "problems falling back to sleep.     On non-work days bedtime is typically 10. Usually it takes about 9 minutes to fall asleep.     Patient is napping  Daily for 30 minutes   Patient is using caffeine. 1/day.  Patient is taking dug holidays, cannot quantitate frequency  (6 days/month?)  She has 'sleep attacks ' and  Takes 4 days to get out of it  Patient birth control is tubal ligation     Started alprazolam recently for  'panic attacks' (rare so far)    She uses THC 5 times a  week      Past medical/surgical history, family history, social history, medications and allergies were reviewed.      Problem List:  Patient Active Problem List    Diagnosis Date Noted     BLAKE (obstructive sleep apnea)- mild (AHI 12) 04/30/2021     Priority: Medium     4/27/2021 [place] [facility] Sleep Study (195.0 lbs) - scored using 3% rules AHI 12.9, RDI 18.5, Supine AHI 12.0, REM AHI 6.7, Low O2 88.0%, Time Spent ?88% 0 minutes / Time Spent ?89% 0 minutes.       Tobacco use disorder 02/19/2020     Priority: Medium     Narcolepsy without cataplexy(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 PSG 6/14/04 and it showed a normal AHI (no BLAKE).  MSLT 6/15/04  showed a mean latency of 6.4 minutes and 4 SOREMs         Essential hypertension 10/13/2017     Priority: Medium     History of alcohol abuse 02/19/2014     Priority: Medium     S/p treatment about 2010       Moderate episode of recurrent major depressive disorder (H) 02/19/2014     Priority: Medium     Migraine with aura 02/27/2008     Priority: Medium     BMI 32.0-32.9,adult 04/26/2021     Priority: Low     Chronic insomnia 07/23/2020     Priority: Low     Irritable bowel syndrome 01/24/2004     Priority: Low        BP (!) 150/93   Pulse 77   Resp 16   Ht 1.689 m (5' 6.5\")   Wt 86.4 kg (190 lb 6.4 oz)   SpO2 99%   BMI 30.27 kg/m      Impression/Plan:     Mild Sleep apnea.  Did not tolerate CPAP well due to awakening feeling suffocated. " Implications disussed  - Recommend retrial CPAP with closer follow-up/   - STM referral     Narcolepsy without cataplexy  Last Rx was for 1/22/21. She may be having wthdrwaal symptoms on her drug holidays  - Changed adderall XR to Vyvanse  - Chart check through [facility] in 2 weeks  - Encouraged a long drug holiday, she is reluctant  - Consider pitolisant, Xyrem  - 1 months refills done  - Urine drug screen today  - Controlled substance agreement done today  - Caffeine was discussed     Sleep onset , maintenance difficulties   Suspect psychophysiologic insomnia. Sleep disturbance due to narcoplepsy may be suspected  Fair control, on zolpidem through another provider  - Suggested shorter naps, later bed times  - Reviewed stimulus control  - Consider cognitive behavioral training     Maddi Fam will follow up in about 3 month(s).     I spent 45 minutes with patient including counseling, and 20 minutes with chart review, and documentation

## 2022-02-09 ENCOUNTER — OFFICE VISIT (OUTPATIENT)
Dept: SLEEP MEDICINE | Facility: CLINIC | Age: 47
End: 2022-02-09
Payer: COMMERCIAL

## 2022-02-09 VITALS
HEIGHT: 67 IN | SYSTOLIC BLOOD PRESSURE: 150 MMHG | HEART RATE: 77 BPM | DIASTOLIC BLOOD PRESSURE: 93 MMHG | WEIGHT: 190.4 LBS | OXYGEN SATURATION: 99 % | RESPIRATION RATE: 16 BRPM | BODY MASS INDEX: 29.88 KG/M2

## 2022-02-09 DIAGNOSIS — Z79.899 CONTROLLED SUBSTANCE AGREEMENT SIGNED: Primary | ICD-10-CM

## 2022-02-09 DIAGNOSIS — F51.04 CHRONIC INSOMNIA: ICD-10-CM

## 2022-02-09 DIAGNOSIS — G47.33 OSA (OBSTRUCTIVE SLEEP APNEA): ICD-10-CM

## 2022-02-09 DIAGNOSIS — G47.419 NARCOLEPSY WITHOUT CATAPLEXY(347.00): ICD-10-CM

## 2022-02-09 LAB — CREAT UR-MCNC: 167 MG/DL

## 2022-02-09 PROCEDURE — 80307 DRUG TEST PRSMV CHEM ANLYZR: CPT | Performed by: INTERNAL MEDICINE

## 2022-02-09 PROCEDURE — 99215 OFFICE O/P EST HI 40 MIN: CPT | Performed by: INTERNAL MEDICINE

## 2022-02-09 RX ORDER — SENNOSIDES 8.6 MG
650 CAPSULE ORAL
COMMUNITY
Start: 2022-02-01 | End: 2024-02-07

## 2022-02-09 RX ORDER — DEXTROAMPHETAMINE SACCHARATE, AMPHETAMINE ASPARTATE, DEXTROAMPHETAMINE SULFATE AND AMPHETAMINE SULFATE 7.5; 7.5; 7.5; 7.5 MG/1; MG/1; MG/1; MG/1
30 TABLET ORAL 2 TIMES DAILY
Qty: 60 TABLET | Refills: 0 | Status: SHIPPED | OUTPATIENT
Start: 2022-02-20 | End: 2022-04-04

## 2022-02-09 RX ORDER — LISDEXAMFETAMINE DIMESYLATE 20 MG/1
20 CAPSULE ORAL EVERY MORNING
Qty: 30 CAPSULE | Refills: 0 | Status: SHIPPED | OUTPATIENT
Start: 2022-02-20 | End: 2022-04-04

## 2022-02-09 RX ORDER — LOSARTAN POTASSIUM 100 MG/1
100 TABLET ORAL DAILY
COMMUNITY
Start: 2021-11-28

## 2022-02-09 RX ORDER — ESTRADIOL 0.5 MG/1
0.5 TABLET ORAL DAILY
COMMUNITY
Start: 2022-01-31

## 2022-02-09 RX ORDER — TOPIRAMATE 100 MG/1
100 TABLET, FILM COATED ORAL 2 TIMES DAILY
COMMUNITY
Start: 2021-10-19

## 2022-02-09 RX ORDER — BUPROPION HYDROCHLORIDE 300 MG/1
300 TABLET ORAL
COMMUNITY
Start: 2022-02-01

## 2022-02-09 RX ORDER — PROGESTERONE 100 MG/1
100 CAPSULE ORAL
COMMUNITY
Start: 2022-02-01 | End: 2023-02-01

## 2022-02-09 RX ORDER — POTASSIUM CHLORIDE 20MEQ/15ML
20 LIQUID (ML) ORAL
COMMUNITY
Start: 2021-01-28 | End: 2023-12-15

## 2022-02-09 RX ORDER — SUMATRIPTAN 100 MG/1
100 TABLET, FILM COATED ORAL
COMMUNITY
Start: 2021-02-20 | End: 2022-02-09

## 2022-02-09 RX ORDER — ALPRAZOLAM 0.25 MG
0.25 TABLET ORAL
COMMUNITY
Start: 2022-02-01 | End: 2023-08-02 | Stop reason: DRUGHIGH

## 2022-02-09 RX ORDER — DULOXETIN HYDROCHLORIDE 60 MG/1
1 CAPSULE, DELAYED RELEASE ORAL DAILY
COMMUNITY
Start: 2022-01-14

## 2022-02-09 ASSESSMENT — MIFFLIN-ST. JEOR: SCORE: 1518.34

## 2022-02-09 NOTE — NURSING NOTE
3 month follow-up scheduled. Brought patient to lab for UDS. CSA signed by all parties and scanned into chart. Danuta Caceres CMA

## 2022-02-09 NOTE — PATIENT INSTRUCTIONS
Your BMI is Body mass index is 30.27 kg/m .  Weight management is a personal decision.  If you are interested in exploring weight loss strategies, the following discussion covers the approaches that may be successful. Body mass index (BMI) is one way to tell whether you are at a healthy weight, overweight, or obese. It measures your weight in relation to your height.  A BMI of 18.5 to 24.9 is in the healthy range. A person with a BMI of 25 to 29.9 is considered overweight, and someone with a BMI of 30 or greater is considered obese. More than two-thirds of [place] adults are considered overweight or obese.  Being overweight or obese increases the risk for further weight gain. Excess weight may lead to heart disease and diabetes.  Creating and following plans for healthy eating and physical activity may help you improve your health.  Weight control is part of healthy lifestyle and includes exercise, emotional health, and healthy eating habits. Careful eating habits lifelong are the mainstay of weight control. Though there are significant health benefits from weight loss, long-term weight loss with diet alone may be very difficult to achieve- studies show long-term success with dietary management in less than 10% of people. Attaining a healthy weight may be especially difficult to achieve in those with severe obesity. In some cases, medications, devices and surgical management might be considered.  What can you do?  If you are overweight or obese and are interested in methods for weight loss, you should discuss this with your provider.     Consider reducing daily calorie intake by 500 calories.     Keep a food journal.     Avoiding skipping meals, consider cutting portions instead.    Diet combined with exercise helps maintain muscle while optimizing fat loss. Strength training is particularly important for building and maintaining muscle mass. Exercise helps reduce stress, increase energy, and improves fitness.  Increasing exercise without diet control, however, may not burn enough calories to loose weight.       Start walking three days a week 10-20 minutes at a time    Work towards walking thirty minutes five days a week     Eventually, increase the speed of your walking for 1-2 minutes at time    And look into health and wellness programs that may be available through your health insurance provider, employer, local community center, or keara club.    Weight management plan: Patient was referred to their PCP to discuss a diet and exercise plan.

## 2022-02-09 NOTE — LETTER
[facility]  02/09/22  Patient: Maddi Fam  YOB: 1975  Medical Record Number: [id number]                                                                                  Non-Opioid Controlled Substance Agreement    This is an agreement between you and your provider regarding safe and appropriate use of controlled substances prescribed by your care team. Controlled substances are?medicines that can cause physical and mental dependence (abuse).     There are strict laws about having and using these medicines. We here at [facility] are  committed to working with you in your efforts to get better. To support you in this work, we'll help you schedule regular office appointments for medicine refills. If we must cancel or change your appointment for any reason, we'll make sure you have enough medicine to last until your next appointment.     As a Provider, I will:     Listen carefully to your concerns while treating you with respect.     Recommend a treatment plan that I believe is in your best interest and may involve therapies other than medicine.      Talk with you often about the possible benefits and the risk of harm of any medicine that we prescribe for you.    Assess the safety of this medicine and check how well it works.      Provide a plan on how to taper (discontinue or go off) using this medicine if the decision is made to stop its use.      ::  As a Patient, I understand controlled substances:       Are prescribed by my care provider to help me function or work and manage my condition(s).?    Are strong medicines and can cause serious side effects.       Need to be taken exactly as prescribed.?Combining controlled substances with certain medicines or chemicals (such as illegal drugs, alcohol, sedatives, sleeping pills, and benzodiazepines) can be dangerous or even fatal.? If I stop taking my medicines suddenly, I may have severe withdrawal symptoms.      The risks, benefits, and side effects of these medicine(s) were explained to me. I agree that:    1. I will take part in other treatments as advised by my care team. This may be psychiatry or counseling, physical therapy, behavioral therapy, group treatment or a referral to specialist.    2. I will keep all my appointments and understand this is part of the monitoring of controlled substances.?My care team may require an office visit for EVERY controlled substance refill. If I miss appointments or don t follow instructions, my care team may stop my medicine    3. I will take my medicines as prescribed. I will not change the dose or schedule unless my care team tells me to. There will be no refills if I run out early.      4. I may be asked to come to the clinic and complete a urine drug test or complete a pill count. If I don t give a urine sample or participate in a pill count, the care team may stop my medicine.    5. I will only receive controlled substance prescriptions from this clinic. If I am treated by another provider, I will tell them that I am taking controlled substances and that I have a treatment agreement with this provider. I will inform my [facility] care team within one business day if I am given a prescription for any controlled substance by another healthcare provider. My [facility] care team can contact other providers and pharmacists about my use of any medicines.    6. It is up to me to make sure that I don't run out of my medicines on weekends or holidays.?If my care team is willing to refill my prescription without a visit, I must request refills only during office hours. Refills may take up to 3 business days to process. I will use one pharmacy to fill all my controlled substance prescriptions. I will notify the clinic about any changes to my insurance or medicine availability.    7. I am responsible for my prescriptions. If the medicine/prescription is lost, stolen or  destroyed, it will not be replaced.?I also agree not to share controlled substance medicines with anyone.     8. I am aware I should not use any illegal or recreational drugs. I agree not to drink alcohol unless my care team says I can.     9. If I enroll in the [place] Medical Cannabis program, I will tell my care team before my next refill.    10. I will tell my care team right away if I become pregnant, have a new medical problem treated outside of my regular clinic, or have a change in my medicines.     11. I understand that this medicine can affect my thinking, judgment and reaction time.? Alcohol and drugs affect the brain and body, which can affect the safety of my driving. Being under the influence of alcohol or drugs can affect my decision-making, behaviors, personal safety and the safety of others. Driving while impaired (DWI) can occur if a person is driving, operating or in physical control of a car, motorcycle, boat, snowmobile, ATV, motorbike, off-road vehicle or any other motor vehicle ([place] Statute 169A.20). I understand the risk if I choose to drive or operate any vehicle or machinery.    I understand that if I do not follow any of the conditions above, my prescriptions or treatment may be stopped or changed.   I agree that my provider, clinic care team and pharmacy may work with any city, state or federal law enforcement agency that investigates the misuse, sale or other diversion of my controlled medicine. I will allow my provider to discuss my care with, or share a copy of, this agreement with any other treating provider, pharmacy or emergency room where I receive care.     I have read this agreement and have asked questions about anything I did not understand.    ________________________________________________________  Patient Signature - Maddi Fam     ___________________                   Date     ________________________________________________________  Provider Signature - Jong RANGEL  Svee, MD       ___________________                   Date     ________________________________________________________  Witness Signature (required if provider not present while patient signing)          ___________________                   Date

## 2022-02-09 NOTE — NURSING NOTE
"Chief Complaint   Patient presents with     Sleep Problem     Narcolepsy, discontinued \"gave up on\" CPAP machine       Initial BP (!) 150/93   Pulse 77   Resp 16   Ht 1.689 m (5' 6.5\")   Wt 86.4 kg (190 lb 6.4 oz)   SpO2 99%   BMI 30.27 kg/m   Estimated body mass index is 30.27 kg/m  as calculated from the following:    Height as of this encounter: 1.689 m (5' 6.5\").    Weight as of this encounter: 86.4 kg (190 lb 6.4 oz).    Medication Reconciliation: complete  ESS: 16  Neck circumference: 36 centimeters.  DME: Unsure  Danuta Caceres CMA      "

## 2022-02-14 LAB
AMPHET UR CFM-MCNC: 6320 NG/ML
AMPHET/CREAT UR: 3784 NG/MG {CREAT}

## 2022-02-23 ENCOUNTER — DOCUMENTATION ONLY (OUTPATIENT)
Dept: SLEEP MEDICINE | Facility: CLINIC | Age: 47
End: 2022-02-23
Payer: COMMERCIAL

## 2022-02-23 DIAGNOSIS — G47.419 NARCOLEPSY WITHOUT CATAPLEXY(347.00): ICD-10-CM

## 2022-02-23 DIAGNOSIS — F51.04 CHRONIC INSOMNIA: ICD-10-CM

## 2022-02-23 DIAGNOSIS — G47.33 OSA (OBSTRUCTIVE SLEEP APNEA): ICD-10-CM

## 2022-02-23 NOTE — PROGRESS NOTES
STM Recheck:  Spoke with patient about starting CPAP again, she said she has been sick and hasn't started using CPAP yet. Patient will call when she starts using CPAP again.

## 2022-03-06 ENCOUNTER — HEALTH MAINTENANCE LETTER (OUTPATIENT)
Age: 47
End: 2022-03-06

## 2022-08-01 ENCOUNTER — VIRTUAL VISIT (OUTPATIENT)
Dept: SLEEP MEDICINE | Facility: CLINIC | Age: 47
End: 2022-08-01
Payer: COMMERCIAL

## 2022-08-01 VITALS
HEIGHT: 66 IN | WEIGHT: 185 LBS | BODY MASS INDEX: 29.73 KG/M2 | SYSTOLIC BLOOD PRESSURE: 133 MMHG | DIASTOLIC BLOOD PRESSURE: 86 MMHG

## 2022-08-01 DIAGNOSIS — F51.04 CHRONIC INSOMNIA: ICD-10-CM

## 2022-08-01 DIAGNOSIS — G47.419 NARCOLEPSY WITHOUT CATAPLEXY(347.00): Primary | ICD-10-CM

## 2022-08-01 DIAGNOSIS — G47.33 OSA (OBSTRUCTIVE SLEEP APNEA): ICD-10-CM

## 2022-08-01 PROCEDURE — 99214 OFFICE O/P EST MOD 30 MIN: CPT | Mod: GT | Performed by: INTERNAL MEDICINE

## 2022-08-01 RX ORDER — DEXTROAMPHETAMINE SACCHARATE, AMPHETAMINE ASPARTATE, DEXTROAMPHETAMINE SULFATE AND AMPHETAMINE SULFATE 7.5; 7.5; 7.5; 7.5 MG/1; MG/1; MG/1; MG/1
15-30 TABLET ORAL 2 TIMES DAILY PRN
Qty: 60 TABLET | Refills: 0 | Status: SHIPPED | OUTPATIENT
Start: 2023-09-02 | End: 2022-09-15

## 2022-08-01 RX ORDER — DEXTROAMPHETAMINE SACCHARATE, AMPHETAMINE ASPARTATE, DEXTROAMPHETAMINE SULFATE AND AMPHETAMINE SULFATE 7.5; 7.5; 7.5; 7.5 MG/1; MG/1; MG/1; MG/1
15-30 TABLET ORAL 2 TIMES DAILY PRN
Qty: 60 TABLET | Refills: 0 | Status: SHIPPED | OUTPATIENT
Start: 2022-09-02 | End: 2023-02-13

## 2022-08-01 RX ORDER — DEXTROAMPHETAMINE SACCHARATE, AMPHETAMINE ASPARTATE MONOHYDRATE, DEXTROAMPHETAMINE SULFATE AND AMPHETAMINE SULFATE 7.5; 7.5; 7.5; 7.5 MG/1; MG/1; MG/1; MG/1
30 CAPSULE, EXTENDED RELEASE ORAL DAILY
Qty: 30 CAPSULE | Refills: 0 | Status: SHIPPED | OUTPATIENT
Start: 2022-08-01 | End: 2022-09-15

## 2022-08-01 RX ORDER — DEXTROAMPHETAMINE SACCHARATE, AMPHETAMINE ASPARTATE MONOHYDRATE, DEXTROAMPHETAMINE SULFATE AND AMPHETAMINE SULFATE 7.5; 7.5; 7.5; 7.5 MG/1; MG/1; MG/1; MG/1
30 CAPSULE, EXTENDED RELEASE ORAL DAILY
Qty: 30 CAPSULE | Refills: 0 | Status: SHIPPED | OUTPATIENT
Start: 2022-09-30 | End: 2023-02-13

## 2022-08-01 RX ORDER — DEXTROAMPHETAMINE SACCHARATE, AMPHETAMINE ASPARTATE MONOHYDRATE, DEXTROAMPHETAMINE SULFATE AND AMPHETAMINE SULFATE 7.5; 7.5; 7.5; 7.5 MG/1; MG/1; MG/1; MG/1
30 CAPSULE, EXTENDED RELEASE ORAL DAILY
Qty: 30 CAPSULE | Refills: 0 | Status: SHIPPED | OUTPATIENT
Start: 2022-10-30 | End: 2023-02-13

## 2022-08-01 ASSESSMENT — SLEEP AND FATIGUE QUESTIONNAIRES
HOW LIKELY ARE YOU TO NOD OFF OR FALL ASLEEP WHILE SITTING AND READING: HIGH CHANCE OF DOZING
HOW LIKELY ARE YOU TO NOD OFF OR FALL ASLEEP WHILE SITTING INACTIVE IN A PUBLIC PLACE: MODERATE CHANCE OF DOZING
HOW LIKELY ARE YOU TO NOD OFF OR FALL ASLEEP WHILE WATCHING TV: HIGH CHANCE OF DOZING
HOW LIKELY ARE YOU TO NOD OFF OR FALL ASLEEP WHILE LYING DOWN TO REST IN THE AFTERNOON WHEN CIRCUMSTANCES PERMIT: HIGH CHANCE OF DOZING
HOW LIKELY ARE YOU TO NOD OFF OR FALL ASLEEP IN A CAR, WHILE STOPPED FOR A FEW MINUTES IN TRAFFIC: SLIGHT CHANCE OF DOZING
HOW LIKELY ARE YOU TO NOD OFF OR FALL ASLEEP WHILE SITTING AND TALKING TO SOMEONE: WOULD NEVER DOZE
HOW LIKELY ARE YOU TO NOD OFF OR FALL ASLEEP WHEN YOU ARE A PASSENGER IN A CAR FOR AN HOUR WITHOUT A BREAK: HIGH CHANCE OF DOZING
HOW LIKELY ARE YOU TO NOD OFF OR FALL ASLEEP WHILE SITTING QUIETLY AFTER LUNCH WITHOUT ALCOHOL: MODERATE CHANCE OF DOZING

## 2022-08-01 NOTE — PROGRESS NOTES
Ahmet is a 47 year old who is being evaluated via a billable video visit.      How would you like to obtain your AVS? MyChart  If the video visit is dropped, the invitation should be resent by: Text to cell phone: [phone]  Will anyone else be joining your video visit? No        Video-Visit Details    Video Start Time:     Type of service:  Video Visit    Video End Time:     Originating Location (pt. Location): Other [place]    Distant Location (provider location):  [facility]     Platform used for Video Visit: August Martins

## 2022-08-01 NOTE — PROGRESS NOTES
Medication Follow-Up Visit:    Chief Complaint   Patient presents with     Other     Follow up       Maddi Cristel for follow-up of narcolepsy and mild obstructive sleep apnea     The patient was diagnosed with narcolepsy in 2004 at [facility]. Initial PSG in 2002 did not show any sleep disordered breathing. She represented to Rajat Oswald at [facility] in 2004 and reported her sleepiness improved and then subsequently reoccurred. She has a second sleep study 6/14/04 (145#) that again showed a normal AHI (no BLAKE). She completed an MSLT the following day (6/15/04) and this showed a mean latency of 6.3 minutes and 4 SOREMs in 5 naps     There does not appear to be actigraphy, subjective history regarding total sleep time, or urine drug screen done in 2004. Likewise medication list from that time is not available.     Unclear if/where she was treated from 7469-5759     She says she was treated with Xyrem for a year in late 2000s, but had small children and  worked nights.     Patient was seen by Jerson Brown at [facility] 07/06/2018 to establish care for narcolepsy with cataplexy taking Adderall XR 30 mg PO two times a day XR 30 mg short acting two times a day (120 mg per day but on most days but on some days 90 mg). The patient described brief moments of cataplexy that occur in infrequent clusters. She described brief episodes during which she feels that she is going to fall or he head is weak.     Her care was transferred to Dr. Whitman 1/2019 where concerns regarding higher than normal doses of Adderall was expressed. She described her cataplexy as feeling like her medication is not working and her body is shutting down. Then laughter, or emotion or tiredness brings it on. Then she gets feelings of extreme sleepiness and sleep attack. It was felt that these episodes were sleep attacks NOT cataplexy.     [employer] testing 1/29/2019   COMT genotype is associated with reduced therapeutic response to this drug:  Amphetamine salts, dexmethylphendiate, dextroamphetamine, lisdexamfetamine, methylphenidate     In 2/2019 she entered into Pitolisant study, but it did not seem effective and was associated with weight gain     Care transferred to Jong Melgoza MD 4/8/2020. Given weight gain, snoring, sleep maintenance difficulties, long sleep times and the fact we are considering Xyrem we recommended a Sleep ApneaTest to rule out interval development of obstructive sleep apnea     Study Date: 4/27/2021 (195.0 lbs) Respiration: scored using 3% rules  - Sleep was disrupted by arousals. Reduced REM sleep was noted. Dense spindle activity and alpha intrusions were noted.   - Apnea/hypopnea index was 12.9 events per hour (central apnea/hypopnea index was 0.5 events per hour). The REM AHI was 6.7 events per hour. The supine AHI was 12.0 events per hour. The RERA index was 5.6 events per hour. The RDI was 18.5 events per hour.  - Lowest oxygen saturation was 88.0%. Time spent less than or equal to 88% was 0 minutes. Time spent less than or equal to 89% was 0 minutes.  - PLM index was 0.7 movements per hour. The PLM Arousal Index was 0.4 per hour.    She did not tolerate CPAP Because she woke up feeling suffocated after 3-4 hours. She would take it off. She tried using for 1 month.     At 2/2022 visit we recommended retrial CPAP with closer follow-up, STM referral....  She appeared to be having wthdrawal symptoms on her drug holidays. Changed adderall XR to Vyvanse. Encouraged a long drug holiday. She went almost 3 weeks without any stimulants which was horrible but afterwards was managing well with less medication.    No unusual Rx in [facility] database 7/28/2022   Urine drug screen with expected findings 2/2022    Patient also takes Xanaz, zolpidem...     She does like vyvaanse. Does not feel like its helping her.     It did not seem to help after she started it after her drug holiday even though with one pill of adderalal she was up for  several days. She is taking 30 mg of aderrall BID    Patient is  taking dug holidays    Total score - [place]: 17 (8/1/2022  2:41 PM)    LAZARUS Total Score: 17    Her insomnia is not bothering her  She thinks its related to taking when she was taking 2 XR medications      ResMed   Auto-PAP usage data:    9 days of use in last 90 days, 3 days > 4 hours        EPWORTH SLEEPINESS SCALE      [place] Sleepiness Scale ( NERIS Damon  9160-3082<br>ESS - [place]/English - Final version - 21 Nov 07 - [facility].) 8/1/2022   Sitting and reading High chance of dozing   Watching TV High chance of dozing   Sitting, inactive in a public place (e.g. a theatre or a meeting) Moderate chance of dozing   As a passenger in a car for an hour without a break High chance of dozing   Lying down to rest in the afternoon when circumstances permit High chance of dozing   Sitting and talking to someone Would never doze   Sitting quietly after a lunch without alcohol Moderate chance of dozing   In a car, while stopped for a few minutes in traffic Slight chance of dozing   [place] Score (MC) 17   [place] Score (Sleep) 17       INSOMNIA SEVERITY INDEX (LAZARUS)      Insomnia Severity Index (LAZARUS) 8/1/2022   Difficulty falling asleep 0   Difficulty staying asleep 1   Problems waking up too early 1   How SATISFIED/DISSATISFIED are you with your CURRENT sleep pattern? 3   How NOTICEABLE to others do you think your sleep problem is in terms of impairing the quality of your life? 4   How WORRIED/DISTRESSED are you about your current sleep problem? 4   To what extent do you consider your sleep problem to INTERFERE with your daily functioning (e.g. daytime fatigue, mood, ability to function at work/daily chores, concentration, memory, mood, etc.) CURRENTLY? 4   LAZARUS Total Score 17       Guidelines for Scoring/Interpretation:  Total score categories:  0-7 = No clinically significant insomnia   8-14 = Subthreshold insomnia   15-21 = Clinical insomnia  (moderate severity)  22-28 = Clinical insomnia (severe)  Used via courtesy of www.[place].va.gov with permission from Daniel Price PhD., [facility]      Past medical/surgical history, family history, social history, medications and allergies were reviewed.      Problem List:  Patient Active Problem List    Diagnosis Date Noted     Controlled substance agreement signed 02/09/2022     Priority: High     Patient is followed by CLAUDIA RINCON for ongoing prescription of stimulants.  All refills should be approved by this provider, or covering partner.    Medication(s): Vyvance 20/ Adderall 30.   Maximum quantity per month: 30/60  Clinic visit frequency required: Q 6  months     Controlled substance agreement on file: Yes       Date(s): 2/9/22      Last [facility] website verification:  5/8/2022   [email or web address]           BLAKE (obstructive sleep apnea)- mild (AHI 12) 04/30/2021     Priority: Medium     4/27/2021 [place] [facility] Sleep Study (195.0 lbs) - scored using 3% rules AHI 12.9, RDI 18.5, Supine AHI 12.0, REM AHI 6.7, Low O2 88.0%, Time Spent ?88% 0 minutes / Time Spent ?89% 0 minutes.       Tobacco use disorder 02/19/2020     Priority: Medium     Narcolepsy without cataplexy(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 PSG 6/14/04 and it showed a normal AHI (no BLAKE).  MSLT 6/15/04  showed a mean latency of 6.4 minutes and 4 SOREMs         Essential hypertension 10/13/2017     Priority: Medium     History of alcohol abuse 02/19/2014     Priority: Medium     S/p treatment about 2010       Moderate episode of recurrent major depressive disorder (H) 02/19/2014     Priority: Medium     Migraine with aura 02/27/2008     Priority: Medium     BMI 32.0-32.9,adult 04/26/2021     Priority: Low     Chronic insomnia 07/23/2020     Priority: Low     Irritable bowel syndrome 01/24/2004     Priority: Low        /86   Wt 83.9 kg (185 lb)   BMI 29.41  kg/m      Impression/Plan:     Patient was at pool for today's visit, took a while to get roomed  Visit was rushed    Mild Sleep apnea.  Did not tolerate CPAP well due to awakening feeling suffocated. I   She tried 9 nights and did not tolerate it, she is resistant to further attempts at achieving tolerance  - Referred for mandibular advancement device      Narcolepsy without cataplexy  - Changed Vyvanse back to adderall XR 30 every day (previously on 30 BID)  - Consider pitolisant, Xyrem  - 3 months refills done     Sleep onset , maintenance difficulties   Suspect psychophysiologic insomnia. Sleep disturbance due to narcoplepsy may be suspected  Longacting stimulant use also implicated   Currently improved off of aderall XR  Fair control, on zolpidem through another provider      Maddi Fam will follow up in about 6 month(s).     I spent 15 minutes with patient including counseling, and 15 minutes with chart review, and documentation

## 2022-08-08 NOTE — NURSING NOTE
Writer has created a 6 month medication follow up with Dr. Abad Enciso message. It was postponed and will send it out in 3months.

## 2022-09-14 DIAGNOSIS — G47.419 NARCOLEPSY WITHOUT CATAPLEXY(347.00): Primary | ICD-10-CM

## 2022-09-14 NOTE — TELEPHONE ENCOUNTER
Reason for call:  Other   Patient called regarding (reason for call): prescription  Additional comments: Patient states that she is out of her Extended amphetamine-dextroamphetamine since 9/4/22. Patient is asking for a refill on her Extended Adderall for the month of Sept. Her pharmacy will not refill until prescription.  And is asking if she should be taking 1 a day of the extended or go back to 2 day.  Refill asap      Phone number to reach patient:  Cell number on file:    Telephone Information:   Mobile [phone]       Best Time:  anytime    Can we leave a detailed message on this number?  YES    Travel screening: Not Applicable

## 2022-09-15 RX ORDER — DEXTROAMPHETAMINE SACCHARATE, AMPHETAMINE ASPARTATE MONOHYDRATE, DEXTROAMPHETAMINE SULFATE AND AMPHETAMINE SULFATE 7.5; 7.5; 7.5; 7.5 MG/1; MG/1; MG/1; MG/1
30 CAPSULE, EXTENDED RELEASE ORAL DAILY
Qty: 30 CAPSULE | Refills: 0 | Status: SHIPPED | OUTPATIENT
Start: 2022-09-15 | End: 2023-02-13

## 2022-09-15 NOTE — TELEPHONE ENCOUNTER
XR Last Fill: 08/01/2022 (Patient has been out since 09/04/2022)    Non XR Last Fill: 09/14/2022.    Order for 09/02/2023 cancelled and new order for XR pended for fill today.   Pending Prescriptions:                       Disp   Refills    amphetamine-dextroamphetamine (ADDERALL X*30 cap*0            Sig: Take 1 capsule (30 mg) by mouth daily      Will route to provider.     Rosa M Johns RN on 9/15/2022 at 8:49 AM

## 2022-10-07 ENCOUNTER — MYC MEDICAL ADVICE (OUTPATIENT)
Dept: SLEEP MEDICINE | Facility: CLINIC | Age: 47
End: 2022-10-07

## 2022-10-07 NOTE — LETTER
[facility]  [address] [place]  [address]            October 13, 2022      RE: Maddi Fam  [address]  [place] [address]       To whom it may concern:    I am writing this letter on behalf of Ms. Fam.  She has been a patient of mine since 2020. 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    Narcolepsy is a life-long disorder that is caused by deficiency of a neurotransmitter in the brain called orexin or hypocretin. The result of this deficiency is faulty regulation in the brains ability to maintain wakefulness as well as sleep. This in turn leads to patients quickly developing profound sleepiness when awake, and difficulty with wakefulness intruding into sleep.     Treatment of Narcolepsy centers on life management. Frequent naps, regular sleep schedules and adequate sleep time are of paramount importance, but cannot wholly mitigate the disruption that the daytime sleepiness causes.     Because of this most patients are also treated with stimulant medications (amphetamines for example) which promote wakefulness. These medications are helpful but cannot not wholly resolve the problems associated with Narcolepsy and are associated with there own problems (side effects, tolerance, addiction).       If you have any questions, please do not hesitate to contact me.     Sincerely,      Jong Melgoza MD  [phone]  [email or web address]   Board certified Sleep Medicine, Internal Medicine   Medical Director, [facility]

## 2022-10-18 NOTE — TELEPHONE ENCOUNTER
Writer spoke with patient and they requested to have letter be mailed to patient's current address on file. Writer confirmed address. No further actions needed.

## 2022-11-21 ENCOUNTER — HEALTH MAINTENANCE LETTER (OUTPATIENT)
Age: 47
End: 2022-11-21

## 2022-12-13 ENCOUNTER — MYC MEDICAL ADVICE (OUTPATIENT)
Dept: SLEEP MEDICINE | Facility: CLINIC | Age: 47
End: 2022-12-13

## 2022-12-14 NOTE — TELEPHONE ENCOUNTER
I see, problem list was not changed after meds changed    Maybe ask her pharmacy what they have and have them suggest a substitute?

## 2022-12-14 NOTE — TELEPHONE ENCOUNTER
Patients my chart specifies short acting    She has both on her med list:  amphetamine-dextroamphetamine (ADDERALL XR) 30 MG 24 hr capsule    And     amphetamine-dextroamphetamine (ADDERALL) 30 MG tablet    Directions are to take the XR daily and the other  0.5-1 tablets (15-30 mg) by mouth 2 times daily as needed (sleepiness)    Last fill for amphetamine-dextroamphetamine (ADDERALL) 30 MG tablet was 9/14/22

## 2022-12-14 NOTE — TELEPHONE ENCOUNTER
Spoke with pharmacist. He advised they are able to get 10 mg and 20 mg amphetamine-dextroamphetamine (ADDERALL). Could write prescription for 20 mg and take 1-1.5 tabs, or write for both 20 mg and 10 mg.    Left message for patient to see what dose she has been taking

## 2022-12-14 NOTE — TELEPHONE ENCOUNTER
Patient unable to locate supply of her short acting Adderall     amphetamine-dextroamphetamine (ADDERALL) 30 MG tablet  Sig - Route: Take 0.5-1 tablets (15-30 mg) by mouth 2 times daily as needed (sleepiness)    Please advise if alternative can be prescribed to her pharmacy.    Gayle Bhandari RN

## 2022-12-15 RX ORDER — DEXTROAMPHETAMINE SACCHARATE, AMPHETAMINE ASPARTATE, DEXTROAMPHETAMINE SULFATE AND AMPHETAMINE SULFATE 2.5; 2.5; 2.5; 2.5 MG/1; MG/1; MG/1; MG/1
20-30 TABLET ORAL 2 TIMES DAILY PRN
Qty: 180 TABLET | Refills: 0 | Status: SHIPPED | OUTPATIENT
Start: 2022-12-15 | End: 2023-02-13

## 2022-12-16 ENCOUNTER — TELEPHONE (OUTPATIENT)
Dept: SLEEP MEDICINE | Facility: CLINIC | Age: 47
End: 2022-12-16

## 2022-12-16 NOTE — TELEPHONE ENCOUNTER
Prior Authorization Specialty Medication Request    Medication/Dose: amphetamine-dextroamphetamine (ADDERALL) 10 MG tablet  ICD code (if different than what is on RX): Narcolepsy without cataplexy(347.00)  Insurance Name: [facility]  Insurance ID: [id number]  Insurance Phone Number: [phone]     Pharmacy Information (if different than what is on RX)  [id number] [address] AT [address] [address] [facility]  Phone:  [phone]

## 2022-12-19 NOTE — TELEPHONE ENCOUNTER
Central Prior Authorization Team   Phone: [phone]      PA Initiation    Medication: amphetamine-dextroamphetamine (ADDERALL) 10 MG tablet  Insurance Company: [employer] - Phone [phone] Fax [phone]  Pharmacy Filling the Rx: [id number] [address] [facility] [address] [facility] [address] [facility]  Filling Pharmacy Phone: [phone]  Filling Pharmacy Fax:    Start Date: 12/19/2022

## 2022-12-20 NOTE — TELEPHONE ENCOUNTER
Prior Authorization Approval    Authorization Effective Date: 12/19/2022  Authorization Expiration Date: 3/19/2023  Medication: amphetamine-dextroamphetamine (ADDERALL) 10 MG tablet-APPROVED  Approved Dose/Quantity:   Reference #:     Insurance Company: [employer] - Phone [phone] Fax [phone]  Expected CoPay:       CoPay Card Available:      Foundation Assistance Needed:    Which Pharmacy is filling the prescription (Not needed for infusion/clinic administered): [id number] - [facility], [place] - [address] [facility]  Pharmacy Notified: Yes  Patient Notified: No  **Instructed pharmacy to notify patient when script is ready to /ship.**

## 2023-02-09 ASSESSMENT — SLEEP AND FATIGUE QUESTIONNAIRES
HOW LIKELY ARE YOU TO NOD OFF OR FALL ASLEEP IN A CAR, WHILE STOPPED FOR A FEW MINUTES IN TRAFFIC: SLIGHT CHANCE OF DOZING
HOW LIKELY ARE YOU TO NOD OFF OR FALL ASLEEP WHILE SITTING AND READING: HIGH CHANCE OF DOZING
HOW LIKELY ARE YOU TO NOD OFF OR FALL ASLEEP WHILE WATCHING TV: HIGH CHANCE OF DOZING
HOW LIKELY ARE YOU TO NOD OFF OR FALL ASLEEP WHILE LYING DOWN TO REST IN THE AFTERNOON WHEN CIRCUMSTANCES PERMIT: HIGH CHANCE OF DOZING
HOW LIKELY ARE YOU TO NOD OFF OR FALL ASLEEP WHILE SITTING INACTIVE IN A PUBLIC PLACE: MODERATE CHANCE OF DOZING
HOW LIKELY ARE YOU TO NOD OFF OR FALL ASLEEP WHEN YOU ARE A PASSENGER IN A CAR FOR AN HOUR WITHOUT A BREAK: HIGH CHANCE OF DOZING
HOW LIKELY ARE YOU TO NOD OFF OR FALL ASLEEP WHILE SITTING AND TALKING TO SOMEONE: SLIGHT CHANCE OF DOZING
HOW LIKELY ARE YOU TO NOD OFF OR FALL ASLEEP WHILE SITTING QUIETLY AFTER LUNCH WITHOUT ALCOHOL: MODERATE CHANCE OF DOZING

## 2023-02-12 PROBLEM — E66.811 CLASS 1 OBESITY DUE TO EXCESS CALORIES WITHOUT SERIOUS COMORBIDITY WITH BODY MASS INDEX (BMI) OF 30.0 TO 30.9 IN ADULT: Chronic | Status: ACTIVE | Noted: 2021-04-26

## 2023-02-12 PROBLEM — F41.0 PANIC DISORDER WITHOUT AGORAPHOBIA: Status: ACTIVE | Noted: 2023-02-12

## 2023-02-12 PROBLEM — F32.A DEPRESSIVE DISORDER: Status: ACTIVE | Noted: 2023-02-12

## 2023-02-12 PROBLEM — E78.1 HIGH TRIGLYCERIDES: Status: ACTIVE | Noted: 2022-07-07

## 2023-02-12 PROBLEM — E66.09 CLASS 1 OBESITY DUE TO EXCESS CALORIES WITHOUT SERIOUS COMORBIDITY WITH BODY MASS INDEX (BMI) OF 30.0 TO 30.9 IN ADULT: Chronic | Status: ACTIVE | Noted: 2021-04-26

## 2023-02-12 PROBLEM — E78.2 MIXED HYPERLIPIDEMIA: Status: ACTIVE | Noted: 2022-07-07

## 2023-02-12 PROBLEM — E66.811 CLASS 1 OBESITY DUE TO EXCESS CALORIES WITHOUT SERIOUS COMORBIDITY WITH BODY MASS INDEX (BMI) OF 30.0 TO 30.9 IN ADULT: Status: ACTIVE | Noted: 2021-04-26

## 2023-02-12 PROBLEM — N39.3 STRESS INCONTINENCE IN FEMALE: Status: ACTIVE | Noted: 2023-02-12

## 2023-02-12 PROBLEM — F10.21 HISTORY OF ALCOHOLISM (H): Status: ACTIVE | Noted: 2023-02-12

## 2023-02-12 PROBLEM — E66.09 CLASS 1 OBESITY DUE TO EXCESS CALORIES WITHOUT SERIOUS COMORBIDITY WITH BODY MASS INDEX (BMI) OF 30.0 TO 30.9 IN ADULT: Status: ACTIVE | Noted: 2021-04-26

## 2023-02-12 PROBLEM — L70.9 ACNE: Status: RESOLVED | Noted: 2023-02-12 | Resolved: 2023-02-12

## 2023-02-12 PROBLEM — L70.9 ACNE: Status: ACTIVE | Noted: 2023-02-12

## 2023-02-12 PROBLEM — G47.419 NARCOLEPSY: Status: ACTIVE | Noted: 2023-02-12

## 2023-02-13 ENCOUNTER — VIRTUAL VISIT (OUTPATIENT)
Dept: SLEEP MEDICINE | Facility: CLINIC | Age: 48
End: 2023-02-13
Payer: COMMERCIAL

## 2023-02-13 VITALS — HEIGHT: 66 IN | BODY MASS INDEX: 28.93 KG/M2 | WEIGHT: 180 LBS

## 2023-02-13 DIAGNOSIS — G47.419 NARCOLEPSY WITHOUT CATAPLEXY(347.00): Primary | Chronic | ICD-10-CM

## 2023-02-13 DIAGNOSIS — F51.04 CHRONIC INSOMNIA: ICD-10-CM

## 2023-02-13 DIAGNOSIS — Z79.899 CONTROLLED SUBSTANCE AGREEMENT SIGNED: Chronic | ICD-10-CM

## 2023-02-13 DIAGNOSIS — G47.33 OSA (OBSTRUCTIVE SLEEP APNEA): Chronic | ICD-10-CM

## 2023-02-13 PROCEDURE — 99215 OFFICE O/P EST HI 40 MIN: CPT | Mod: VID | Performed by: INTERNAL MEDICINE

## 2023-02-13 RX ORDER — DEXTROAMPHETAMINE SACCHARATE, AMPHETAMINE ASPARTATE MONOHYDRATE, DEXTROAMPHETAMINE SULFATE AND AMPHETAMINE SULFATE 7.5; 7.5; 7.5; 7.5 MG/1; MG/1; MG/1; MG/1
30 CAPSULE, EXTENDED RELEASE ORAL DAILY
Qty: 30 CAPSULE | Refills: 0 | Status: SHIPPED | OUTPATIENT
Start: 2023-02-13 | End: 2023-08-02

## 2023-02-13 RX ORDER — DEXTROAMPHETAMINE SACCHARATE, AMPHETAMINE ASPARTATE MONOHYDRATE, DEXTROAMPHETAMINE SULFATE AND AMPHETAMINE SULFATE 7.5; 7.5; 7.5; 7.5 MG/1; MG/1; MG/1; MG/1
30 CAPSULE, EXTENDED RELEASE ORAL DAILY
Qty: 30 CAPSULE | Refills: 0 | Status: SHIPPED | OUTPATIENT
Start: 2023-04-14 | End: 2023-08-02

## 2023-02-13 RX ORDER — DEXTROAMPHETAMINE SACCHARATE, AMPHETAMINE ASPARTATE MONOHYDRATE, DEXTROAMPHETAMINE SULFATE AND AMPHETAMINE SULFATE 7.5; 7.5; 7.5; 7.5 MG/1; MG/1; MG/1; MG/1
30 CAPSULE, EXTENDED RELEASE ORAL DAILY
Qty: 30 CAPSULE | Refills: 0 | Status: SHIPPED | OUTPATIENT
Start: 2023-03-15 | End: 2023-08-02

## 2023-02-13 RX ORDER — DEXTROAMPHETAMINE SACCHARATE, AMPHETAMINE ASPARTATE, DEXTROAMPHETAMINE SULFATE AND AMPHETAMINE SULFATE 2.5; 2.5; 2.5; 2.5 MG/1; MG/1; MG/1; MG/1
20-30 TABLET ORAL 2 TIMES DAILY PRN
Qty: 180 TABLET | Refills: 0 | Status: SHIPPED | OUTPATIENT
Start: 2023-02-13 | End: 2023-12-15

## 2023-02-13 RX ORDER — DEXTROAMPHETAMINE SACCHARATE, AMPHETAMINE ASPARTATE, DEXTROAMPHETAMINE SULFATE AND AMPHETAMINE SULFATE 2.5; 2.5; 2.5; 2.5 MG/1; MG/1; MG/1; MG/1
20-30 TABLET ORAL 2 TIMES DAILY PRN
Qty: 180 TABLET | Refills: 0 | Status: SHIPPED | OUTPATIENT
Start: 2023-03-15 | End: 2023-08-02

## 2023-02-13 RX ORDER — DEXTROAMPHETAMINE SACCHARATE, AMPHETAMINE ASPARTATE, DEXTROAMPHETAMINE SULFATE AND AMPHETAMINE SULFATE 2.5; 2.5; 2.5; 2.5 MG/1; MG/1; MG/1; MG/1
20-30 TABLET ORAL 2 TIMES DAILY PRN
Qty: 180 TABLET | Refills: 0 | Status: SHIPPED | OUTPATIENT
Start: 2023-04-14 | End: 2023-08-02

## 2023-02-13 NOTE — NURSING NOTE
Is the patient currently in the state of [place]? YES    Visit mode:VIDEO    If the visit is dropped, the patient can be reconnected by: VIDEO VISIT: Text to cell phone: [phone]    Will anyone else be joining the visit? NO    How would you like to obtain your AVS? MyChart    Are changes needed to the allergy or medication list? NO    Comments or concerns regarding today's visit: none    Astrid Beavers, BRIAN/TAMEKA

## 2023-02-13 NOTE — PROGRESS NOTES
Medication Follow-Up Visit:    Chief Complaint   Patient presents with     Video Visit     Medication follow up: 6 months       Maddi Fam for follow-up of narcolepsy, insomnia and mild obstructive sleep apnea     The patient was diagnosed with narcolepsy in 2004 at [facility]. Initial PSG in 2002 did not show any sleep disordered breathing. She represented to Rajat Oswald at [facility] in 2004 and reported her sleepiness improved and then subsequently reoccurred. She has a second sleep study 6/14/04 (145#) that again showed a normal AHI (no BLAKE). She completed an MSLT the following day (6/15/04) and this showed a mean latency of 6.3 minutes and 4 SOREMs in 5 naps     There does not appear to be actigraphy, subjective history regarding total sleep time, or urine drug screen done in 2004. Likewise medication list from that time is not available.     Unclear if/where she was treated from 9922-8907     She says she was treated with Xyrem for a year in late 2000s, but had small children and  worked nights.     Patient was seen by Jerson Brown at [facility] 07/06/2018 to establish care for narcolepsy with cataplexy taking Adderall XR 30 mg PO two times a day XR 30 mg short acting two times a day (120 mg per day but on most days but on some days 90 mg). The patient described brief moments of cataplexy that occur in infrequent clusters. She described brief episodes during which she feels that she is going to fall or he head is weak.     Her care was transferred to Dr. Whitman 1/2019 where concerns regarding higher than normal doses of Adderall was expressed. She described her cataplexy as feeling like her medication is not working and her body is shutting down. Then laughter, or emotion or tiredness brings it on. Then she gets feelings of extreme sleepiness and sleep attack. It was felt that these episodes were sleep attacks NOT cataplexy.     [employer] testing 1/29/2019   COMT genotype is associated with  reduced therapeutic response to this drug: Amphetamine salts, dexmethylphendiate, dextroamphetamine, lisdexamfetamine, methylphenidate     In 2/2019 she entered into Pitolisant study, but it did not seem effective and was associated with weight gain     Care transferred to Jong Melgoza MD 4/8/2020. Given weight gain, snoring, sleep maintenance difficulties, long sleep times and the fact we are considering Xyrem we recommended a Sleep ApneaTest to rule out interval development of obstructive sleep apnea     Study Date: 4/27/2021 (195.0 lbs) Respiration: scored using 3% rules  - Sleep was disrupted by arousals. Reduced REM sleep was noted. Dense spindle activity and alpha intrusions were noted.   - Apnea/hypopnea index was 12.9 events per hour (central apnea/hypopnea index was 0.5 events per hour). The REM AHI was 6.7 events per hour. The supine AHI was 12.0 events per hour. The RERA index was 5.6 events per hour. The RDI was 18.5 events per hour.  - Lowest oxygen saturation was 88.0%. Time spent less than or equal to 88% was 0 minutes. Time spent less than or equal to 89% was 0 minutes.  - PLM index was 0.7 movements per hour. The PLM Arousal Index was 0.4 per hour.    She did not tolerate CPAP Because she woke up feeling suffocated after 3-4 hours. She would take it off. She tried using for 1 month.     At 2/2022 visit we recommended retrial CPAP. She tried 9 nights and did not tolerate itdue to awakening feeling suffocated. She appeared to be having wthdrawal symptoms on her drug holidays. Changed adderall XR to Vyvanse. Encouraged a long drug holiday. She went almost 3 weeks without any stimulants which was horrible but afterwards was managing well with less medication.    She was referred for a mandibular advancement device 8/2022    No unusual Rx in [place]  database 7/28/2022,  not functioning 2/12/2023   Urine drug screen with expected findings 2/2022    Patient also takes Xanaz, zolpidem...     Vyavanse was  not effective and she was changed bad to Aderall XR at reduced dose of 30 mg in 8/2022    She has not found a dental provider that takes MA, but startedcalling at top of list with non-MA providers    Overall, the patient reports they are doing poor.    Adderall shortage has made things hard. Went almost 6 weeks without meds.  She says the short acting she is on does not seem to worrk as well as brand-name adderaal   But on review she is only taking 10mg instead of 30mg (as previously)    She has had a lot of vivid dreaming and sleep paralysis    Has variability in severity of Excessive daytime sleepiness symptoms.     Is not having insomnia lately     During work days bedtime is typically 1030. Usually it takes about <30 minutes to fall asleep.   They are typically getting out of bed at 7.    Patient is napping for 1 hour in AM at 9-10   Patient birth control is tubal ligation     Total score - [place]: 18 (2/9/2023  7:25 AM)    No data recorded    Past medical/surgical history, family history, social history, medications and allergies were reviewed.      Problem List:  Patient Active Problem List    Diagnosis Date Noted     Controlled substance agreement signed 02/09/2022     Priority: High     Patient is followed by CLAUDIA RINCON for ongoing prescription of stimulants.  All refills should be approved by this provider, or covering partner.    Medication(s): Adderall 30/ Adderall XR 60.   Maximum quantity per month: 30/60  Clinic visit frequency required: Q 6  months     Controlled substance agreement on file: Yes       Date(s): 2/9/22      Last [facility] website verification:  5/8/2022   [email or web address]           Panic disorder without agoraphobia 02/12/2023     Priority: Medium     High triglycerides 07/07/2022     Priority: Medium     BLAKE (obstructive sleep apnea)- mild (AHI 12) 04/30/2021     Priority: Medium     4/27/2021 [place] [facility] Sleep Study (195.0 lbs) - scored using 3% rules AHI  "12.9, RDI 18.5, Supine AHI 12.0, REM AHI 6.7, Low O2 88.0%, Time Spent ?88% 0 minutes / Time Spent ?89% 0 minutes.       Tobacco use disorder 02/19/2020     Priority: Medium     Narcolepsy without cataplexy(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 PSG 6/14/04 and it showed a normal AHI (no BLAKE).  MSLT 6/15/04  showed a mean latency of 6.4 minutes and 4 SOREMs         Essential hypertension 10/13/2017     Priority: Medium     History of alcohol abuse 02/19/2014     Priority: Medium     S/p treatment about 2010       Moderate episode of recurrent major depressive disorder (H) 02/19/2014     Priority: Medium     Migraine with aura 02/27/2008     Priority: Medium     Stress incontinence in female 02/12/2023     Priority: Low     Overweight/obesity 04/26/2021     Priority: Low     Chronic insomnia 07/23/2020     Priority: Low     Irritable bowel syndrome 01/24/2004     Priority: Low        Ht 1.676 m (5' 6\")   Wt 81.6 kg (180 lb)   BMI 29.05 kg/m      Impression/Plan:     Narcolepsy without cataplexy  Doing poorly, in part because not using short-acting as prescribed  - Continue Adderall XR 30 every day (previously on 30 BID), consider adding 15 mg and decrease number of short acting pills/month if she starts using increased amount of short acting  - Continue Adderall 10mg 2-3 tablets twice daily as needed (180/month)  - Consider pitcarol Xyrem  - 3 months refills done  - Due for an in person visit     Mild Sleep apnea.  Did not tolerate CPAP well due to awakening feeling suffocated. I   She tried 9 nights and did not tolerate it, she is resistant to further attempts at achieving tolerance  - Referred for mandibular advancement device again with only MA/Medicare providers     Sleep onset , maintenance difficulties   Suspect psychophysiologic insomnia. Sleep disturbance due to narcoplepsy may be suspected  Longacting stimulant use also implicated   Improved on lower doses " of stimulants  On zolpidem through another provider     Maddi Fam will follow up in about 6 month(s) in person      I spent 25 minutes with patient including counseling, and >20 minutes with chart review, and documentation

## 2023-02-13 NOTE — PROGRESS NOTES
Video-Visit Details    Type of service:  Video Visit    Video Start Time (time video started): 2:27 PM     Video End Time (time video stopped): 3:09 PM     Originating Location (pt. Location): Home        Distant Location (provider location):  Off-site    Mode of Communication:  Video Conference via [facility]

## 2023-02-15 ENCOUNTER — TELEPHONE (OUTPATIENT)
Dept: SLEEP MEDICINE | Facility: CLINIC | Age: 48
End: 2023-02-15
Payer: COMMERCIAL

## 2023-02-16 NOTE — TELEPHONE ENCOUNTER
Prior Authorization Specialty Medication Request    Medication/Dose:  Amphetamine-Dextroampetamine 10 mg tablets  ICD code (if different than what is on RX):  Narcolepsy without cataplexy(347.00) [G47.419]    Insurance Name: [id number]  Insurance ID:[id number]   Insurance Phone Number: : [phone]     Pharmacy Information (if different than what is on RX)  [facility]  [id number] [address] [place] [address] AT [address] [place] [address]  Phone:  [phone]      Gayle RANGEL RN  [facility]

## 2023-02-16 NOTE — TELEPHONE ENCOUNTER
Prior Authorization Approval    Authorization Effective Date: 1/16/2023  Authorization Expiration Date: 2/16/2024  Medication: Amphetamine-Dextroampetamine 10 mg tablets  Approved Dose/Quantity:   Reference #:     Insurance Company: [employer] - Phone [phone] Fax [phone]  Expected CoPay:       CoPay Card Available:      Foundation Assistance Needed:    Which Pharmacy is filling the prescription (Not needed for infusion/clinic administered): [id number] - [facility] [address] [facility] AT [address]  Pharmacy Notified: Yes  Patient Notified: Yes

## 2023-02-16 NOTE — TELEPHONE ENCOUNTER
Central Prior Authorization Team   Phone: [phone]    PA Initiation    Medication: Amphetamine-Dextroampetamine 10 mg tablets  Insurance Company: [employer] - Phone [phone] Fax [phone]  Pharmacy Filling the Rx: [id number] [facility] [address] [facility] AT [address] [facility] [facility]  Filling Pharmacy Phone: [phone]  Filling Pharmacy Fax:    Start Date: 2/16/2023

## 2023-02-17 DIAGNOSIS — G47.33 OSA (OBSTRUCTIVE SLEEP APNEA): Primary | Chronic | ICD-10-CM

## 2023-02-17 DIAGNOSIS — F51.04 CHRONIC INSOMNIA: ICD-10-CM

## 2023-02-17 DIAGNOSIS — G47.419 NARCOLEPSY WITHOUT CATAPLEXY(347.00): Chronic | ICD-10-CM

## 2023-02-17 DIAGNOSIS — E66.811 CLASS 1 OBESITY DUE TO EXCESS CALORIES WITHOUT SERIOUS COMORBIDITY WITH BODY MASS INDEX (BMI) OF 30.0 TO 30.9 IN ADULT: Chronic | ICD-10-CM

## 2023-02-17 DIAGNOSIS — E66.09 CLASS 1 OBESITY DUE TO EXCESS CALORIES WITHOUT SERIOUS COMORBIDITY WITH BODY MASS INDEX (BMI) OF 30.0 TO 30.9 IN ADULT: Chronic | ICD-10-CM

## 2023-02-17 RX ORDER — DEXTROAMPHETAMINE SACCHARATE, AMPHETAMINE ASPARTATE, DEXTROAMPHETAMINE SULFATE AND AMPHETAMINE SULFATE 5; 5; 5; 5 MG/1; MG/1; MG/1; MG/1
20-30 TABLET ORAL 2 TIMES DAILY PRN
Qty: 90 TABLET | Refills: 0 | Status: SHIPPED | OUTPATIENT
Start: 2023-02-17 | End: 2023-08-02

## 2023-02-17 NOTE — PROGRESS NOTES
Attempted to call patient to let her know alternate dose of her medication was sent into her pharmacy by Dr. Melgoza. Left this information on patient's VM.  Called [facility] at [phone] and asked the pharmacist to cancel Adderall 10 MG tablets and use the new rx that Dr. Melgoza sent in for the Adderall 20 MG tablets.  They said they would.  Danuta Caceres CMA

## 2023-02-17 NOTE — PROGRESS NOTES
"[facility] faxed a message to Prescriber.   It states, \"10 mg is on backorder, please see if ok to change to 20 mg, if so, please provide an updated rx.     Please advise Dr. Melgoza.      "

## 2023-08-01 ASSESSMENT — SLEEP AND FATIGUE QUESTIONNAIRES
HOW LIKELY ARE YOU TO NOD OFF OR FALL ASLEEP WHILE WATCHING TV: HIGH CHANCE OF DOZING
HOW LIKELY ARE YOU TO NOD OFF OR FALL ASLEEP WHILE SITTING QUIETLY AFTER LUNCH WITHOUT ALCOHOL: MODERATE CHANCE OF DOZING
HOW LIKELY ARE YOU TO NOD OFF OR FALL ASLEEP IN A CAR, WHILE STOPPED FOR A FEW MINUTES IN TRAFFIC: SLIGHT CHANCE OF DOZING
HOW LIKELY ARE YOU TO NOD OFF OR FALL ASLEEP WHILE LYING DOWN TO REST IN THE AFTERNOON WHEN CIRCUMSTANCES PERMIT: HIGH CHANCE OF DOZING
HOW LIKELY ARE YOU TO NOD OFF OR FALL ASLEEP WHILE SITTING AND TALKING TO SOMEONE: SLIGHT CHANCE OF DOZING
HOW LIKELY ARE YOU TO NOD OFF OR FALL ASLEEP WHILE SITTING INACTIVE IN A PUBLIC PLACE: SLIGHT CHANCE OF DOZING
HOW LIKELY ARE YOU TO NOD OFF OR FALL ASLEEP WHEN YOU ARE A PASSENGER IN A CAR FOR AN HOUR WITHOUT A BREAK: MODERATE CHANCE OF DOZING
HOW LIKELY ARE YOU TO NOD OFF OR FALL ASLEEP WHILE SITTING AND READING: MODERATE CHANCE OF DOZING

## 2023-08-02 ENCOUNTER — OFFICE VISIT (OUTPATIENT)
Dept: SLEEP MEDICINE | Facility: CLINIC | Age: 48
End: 2023-08-02
Payer: COMMERCIAL

## 2023-08-02 VITALS
OXYGEN SATURATION: 97 % | WEIGHT: 183.6 LBS | SYSTOLIC BLOOD PRESSURE: 158 MMHG | DIASTOLIC BLOOD PRESSURE: 90 MMHG | BODY MASS INDEX: 29.51 KG/M2 | HEIGHT: 66 IN | HEART RATE: 83 BPM | RESPIRATION RATE: 12 BRPM

## 2023-08-02 DIAGNOSIS — E66.09 CLASS 1 OBESITY DUE TO EXCESS CALORIES WITHOUT SERIOUS COMORBIDITY WITH BODY MASS INDEX (BMI) OF 30.0 TO 30.9 IN ADULT: Chronic | ICD-10-CM

## 2023-08-02 DIAGNOSIS — G47.419 NARCOLEPSY WITHOUT CATAPLEXY(347.00): Primary | ICD-10-CM

## 2023-08-02 DIAGNOSIS — F33.1 MODERATE EPISODE OF RECURRENT MAJOR DEPRESSIVE DISORDER (H): Chronic | ICD-10-CM

## 2023-08-02 DIAGNOSIS — F51.04 CHRONIC INSOMNIA: ICD-10-CM

## 2023-08-02 DIAGNOSIS — Z79.899 CONTROLLED SUBSTANCE AGREEMENT SIGNED: ICD-10-CM

## 2023-08-02 DIAGNOSIS — G47.33 OSA (OBSTRUCTIVE SLEEP APNEA): Chronic | ICD-10-CM

## 2023-08-02 DIAGNOSIS — E66.811 CLASS 1 OBESITY DUE TO EXCESS CALORIES WITHOUT SERIOUS COMORBIDITY WITH BODY MASS INDEX (BMI) OF 30.0 TO 30.9 IN ADULT: Chronic | ICD-10-CM

## 2023-08-02 DIAGNOSIS — N20.0 BILATERAL KIDNEY STONES: ICD-10-CM

## 2023-08-02 LAB
CANNABINOIDS UR QL SCN: ABNORMAL
CREAT UR-MCNC: 174 MG/DL

## 2023-08-02 PROCEDURE — G0482 DRUG TEST DEF 15-21 CLASSES: HCPCS | Performed by: INTERNAL MEDICINE

## 2023-08-02 PROCEDURE — 80307 DRUG TEST PRSMV CHEM ANLYZR: CPT | Performed by: INTERNAL MEDICINE

## 2023-08-02 PROCEDURE — 99215 OFFICE O/P EST HI 40 MIN: CPT | Performed by: INTERNAL MEDICINE

## 2023-08-02 RX ORDER — DEXTROAMPHETAMINE SACCHARATE, AMPHETAMINE ASPARTATE MONOHYDRATE, DEXTROAMPHETAMINE SULFATE AND AMPHETAMINE SULFATE 7.5; 7.5; 7.5; 7.5 MG/1; MG/1; MG/1; MG/1
30 CAPSULE, EXTENDED RELEASE ORAL DAILY
Qty: 30 CAPSULE | Refills: 0 | Status: SHIPPED | OUTPATIENT
Start: 2023-09-01 | End: 2023-12-15

## 2023-08-02 RX ORDER — METOPROLOL SUCCINATE 50 MG/1
50 TABLET, EXTENDED RELEASE ORAL DAILY
COMMUNITY
Start: 2023-06-29 | End: 2024-06-28

## 2023-08-02 RX ORDER — PROGESTERONE 100 MG/1
1 CAPSULE ORAL DAILY
COMMUNITY
Start: 2023-06-29

## 2023-08-02 RX ORDER — ALPRAZOLAM 1 MG
TABLET ORAL
COMMUNITY
Start: 2023-06-29

## 2023-08-02 RX ORDER — CELECOXIB 200 MG/1
200 CAPSULE ORAL
COMMUNITY
Start: 2023-06-29

## 2023-08-02 RX ORDER — DEXTROAMPHETAMINE SACCHARATE, AMPHETAMINE ASPARTATE, DEXTROAMPHETAMINE SULFATE AND AMPHETAMINE SULFATE 2.5; 2.5; 2.5; 2.5 MG/1; MG/1; MG/1; MG/1
20-30 TABLET ORAL 2 TIMES DAILY PRN
Qty: 180 TABLET | Refills: 0 | Status: SHIPPED | OUTPATIENT
Start: 2023-10-01 | End: 2023-12-15

## 2023-08-02 RX ORDER — ONDANSETRON 4 MG/1
4 TABLET, ORALLY DISINTEGRATING ORAL
COMMUNITY
End: 2023-12-15

## 2023-08-02 RX ORDER — DEXTROAMPHETAMINE SACCHARATE, AMPHETAMINE ASPARTATE, DEXTROAMPHETAMINE SULFATE AND AMPHETAMINE SULFATE 2.5; 2.5; 2.5; 2.5 MG/1; MG/1; MG/1; MG/1
20-30 TABLET ORAL 2 TIMES DAILY PRN
Qty: 180 TABLET | Refills: 0 | Status: SHIPPED | OUTPATIENT
Start: 2023-08-02 | End: 2023-12-15

## 2023-08-02 RX ORDER — DEXTROAMPHETAMINE SACCHARATE, AMPHETAMINE ASPARTATE, DEXTROAMPHETAMINE SULFATE AND AMPHETAMINE SULFATE 2.5; 2.5; 2.5; 2.5 MG/1; MG/1; MG/1; MG/1
20-30 TABLET ORAL 2 TIMES DAILY PRN
Qty: 180 TABLET | Refills: 0 | Status: SHIPPED | OUTPATIENT
Start: 2023-09-01 | End: 2023-12-15

## 2023-08-02 RX ORDER — IBUPROFEN 800 MG/1
800 TABLET, FILM COATED ORAL
COMMUNITY
End: 2024-02-07

## 2023-08-02 RX ORDER — DEXTROAMPHETAMINE SACCHARATE, AMPHETAMINE ASPARTATE MONOHYDRATE, DEXTROAMPHETAMINE SULFATE AND AMPHETAMINE SULFATE 7.5; 7.5; 7.5; 7.5 MG/1; MG/1; MG/1; MG/1
30 CAPSULE, EXTENDED RELEASE ORAL DAILY
Qty: 30 CAPSULE | Refills: 0 | Status: SHIPPED | OUTPATIENT
Start: 2023-08-02 | End: 2023-12-15

## 2023-08-02 RX ORDER — ZOLPIDEM TARTRATE 10 MG/1
10 TABLET ORAL
Qty: 30 TABLET | Refills: 0 | Status: SHIPPED | OUTPATIENT
Start: 2023-08-02 | End: 2024-02-07

## 2023-08-02 RX ORDER — DEXTROAMPHETAMINE SACCHARATE, AMPHETAMINE ASPARTATE MONOHYDRATE, DEXTROAMPHETAMINE SULFATE AND AMPHETAMINE SULFATE 7.5; 7.5; 7.5; 7.5 MG/1; MG/1; MG/1; MG/1
30 CAPSULE, EXTENDED RELEASE ORAL DAILY
Qty: 30 CAPSULE | Refills: 0 | Status: SHIPPED | OUTPATIENT
Start: 2023-10-01 | End: 2023-12-15

## 2023-08-02 NOTE — NURSING NOTE
Per provider request, office notes were faxed to Dr. Lang Mcintosh at [phone]. Called [facility] at [phone] and this is the fax number that they gave me for Dr. Mcintosh.

## 2023-08-02 NOTE — PROGRESS NOTES
Medication Follow-Up Visit:      Maddi Fam for follow-up of narcolepsy, insomnia and mild obstructive sleep apnea     The patient was diagnosed with narcolepsy in 2004 at [facility]. Initial PSG in 2002 did not show any sleep disordered breathing. She represented to Rajat Oswald at [facility] in 2004 and reported her sleepiness improved and then subsequently reoccurred. She has a second sleep study 6/14/04 (145#) that again showed a normal AHI (no BLAKE). She completed an MSLT the following day (6/15/04) and this showed a mean latency of 6.3 minutes and 4 SOREMs in 5 naps     There does not appear to be actigraphy, subjective history regarding total sleep time, or urine drug screen done in 2004. Likewise medication list from that time is not available.     Unclear if/where she was treated from [id number]     She says she was treated with Xyrem for a year in late 2000s, but had small children and  worked nights.     Patient was seen by Jerson Brown at [facility] 07/06/2018 to establish care for narcolepsy with cataplexy taking Adderall XR 30 mg PO two times a day XR 30 mg short acting two times a day (120 mg per day but on most days but on some days 90 mg). The patient described brief moments of cataplexy that occur in infrequent clusters. She described brief episodes during which she feels that she is going to fall or he head is weak.     Her care was transferred to Dr. Whitman 1/2019 where concerns regarding higher than normal doses of Adderall was expressed. She described her cataplexy as feeling like her medication is not working and her body is shutting down. Then laughter, or emotion or tiredness brings it on. Then she gets feelings of extreme sleepiness and sleep attack. It was felt that these episodes were sleep attacks NOT cataplexy.     [employer] testing 1/29/2019   COMT genotype is associated with reduced therapeutic response to this drug: Amphetamine salts, dexmethylphendiate, dextroamphetamine,  lisdexamfetamine, methylphenidate     In 2/2019 she entered into Pitolisant study, but it did not seem effective and was associated with weight gain     Care transferred to Jong Melgoza MD 4/8/2020. Given weight gain, snoring, sleep maintenance difficulties, long sleep times and the fact we are considering Xyrem we recommended a Sleep ApneaTest to rule out interval development of obstructive sleep apnea     Study Date: 4/27/2021 (195.0 lbs) Respiration: scored using 3% rules  - Sleep was disrupted by arousals. Reduced REM sleep was noted. Dense spindle activity and alpha intrusions were noted.   - Apnea/hypopnea index was 12.9 events per hour (central apnea/hypopnea index was 0.5 events per hour). The REM AHI was 6.7 events per hour. The supine AHI was 12.0 events per hour. The RERA index was 5.6 events per hour. The RDI was 18.5 events per hour.  - Lowest oxygen saturation was 88.0%. Time spent less than or equal to 88% was 0 minutes. Time spent less than or equal to 89% was 0 minutes.  - PLM index was 0.7 movements per hour. The PLM Arousal Index was 0.4 per hour.    She did not tolerate CPAP Because she woke up feeling suffocated after 3-4 hours. She would take it off. She tried using for 1 month.     At 2/2022 visit we recommended retrial CPAP. She tried 9 nights and did not tolerate it due to awakening feeling suffocated. She appeared to be having wthdrawal symptoms on her drug holidays. Changed adderall XR to Vyvanse. Encouraged a long drug holiday. She went almost 3 weeks without any stimulants which was horrible but afterwards was managing well with less medication.    She was referred for a mandibular advancement device 8/2022  Vyavanse was not effective and she was changed back to Adderall XR at reduced dose of 30 mg in 8/2022    No unusual Rx in [facility] database 8/1/2023   Urine drug screen with expected findings 2/2022    Patient also takes Xanaz, zolpidem...    Taking xanax 1/day  Not on ambien for 6  months. Stopped because of sleep walking     Topamax has been taking 4-5 years for migraines   Cymbalta started recently for depression 6/2023    Aderall IR 10mg - #180 OR 20 mg - #90  Aderall XR 30     Was on Adderall IR 30mg. Due to shortages she received 10 mg tabs, then we changed adderall 10mg to 20 mg tablets #90 take 1-1.5      Last visit we referred for mandibular advancement device again with only [place]/Medicare providers last visit  This has not been accomplished    Lots of stress.... needs to move, get a car, and get a job in 60 days  Living with husbands grandpa, family moving him to a nursing home   She did get a job as a ''     Feeling hot and cold  Can't sleep due to an over-active mind    Depressed, anxious      During work days bedtime is typically 930. They are typically getting out of bed at 530.    On non-work days bedtime is typically 11. They are typically getting out of bed at 9.      Patient is not napping.   Patient is not using caffeine.    Has not use cannabis recently   Patient birth control is tubal ligation     Total score - Combs: 15 (8/1/2023  3:56 PM)  LAZARUS Total Score: 27        Past medical/surgical history, family history, social history, medications and allergies were reviewed.      Problem List:  Patient Active Problem List    Diagnosis Date Noted    Controlled substance agreement signed 02/09/2022     Priority: High     Patient is followed by CLAUDIA RINCON for ongoing prescription of stimulants.  All refills should be approved by this provider, or covering partner.    Medication(s): Adderall 10/ Adderall XR 30.   Maximum quantity per month: 180/30  Clinic visit frequency required: Q 6  months     Controlled substance agreement on file: Yes       Date(s): 8/2/2023       Last [facility] website verification:  8/1/2023   [email or web address]          Bilateral kidney stones 06/15/2023     Priority: Medium    Panic disorder without agoraphobia 02/12/2023      "Priority: Medium    High triglycerides 07/07/2022     Priority: Medium    BLAKE (obstructive sleep apnea)- mild (AHI 12) 04/30/2021     Priority: Medium     4/27/2021 [facility] Study (195.0 lbs) - scored using 3% rules AHI 12.9, RDI 18.5, Supine AHI 12.0, REM AHI 6.7, Low O2 88.0%, Time Spent ?88% 0 minutes / Time Spent ?89% 0 minutes.      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 PSG 6/14/04 and it showed a normal AHI (no BLAKE).  MSLT 6/15/04  showed a mean latency of 6.4 minutes and 4 SOREMs        Essential hypertension 10/13/2017     Priority: Medium    History of alcohol abuse 02/19/2014     Priority: Medium     S/p treatment about 2010      Moderate episode of recurrent major depressive disorder (H) 02/19/2014     Priority: Medium    Migraine with aura 02/27/2008     Priority: Medium    Stress incontinence in female 02/12/2023     Priority: Low    Overweight/obesity 04/26/2021     Priority: Low    Chronic insomnia 07/23/2020     Priority: Low    Irritable bowel syndrome 01/24/2004     Priority: Low        BP (!) 158/90   Pulse 83   Resp 12   Ht 1.676 m (5' 6\")   Wt 83.3 kg (183 lb 9.6 oz)   SpO2 97%   BMI 29.63 kg/m      Patient anxious, affect flat  Good eye contact, thought content logical         Impression/Plan:    Narcolepsy without cataplexy  - Continue Adderall XR 30 every day,   - Continue Adderall 10mg 2-3 tablets twice daily as needed (180/month)  - Consider pitolisant, Xyrem  - Controlled substance agreement today  - Urine drug screen today   - 3 months refills done    Mild Sleep apnea.  Did not tolerate CPAP well due to awakening feeling suffocated.   She tried 9 nights and did not tolerate it, she is resistant to further attempts at achieving tolerance  - Referred for mandibular advancement x2 in past year. Readdress at follow-up      Sleep onset, " maintenance difficulties   Acute on chronic insomnia with worsened depression and anxiety   Suspect chronic psychophysiologic insomnia. Sleep disturbance due to narcoplepsy may be suspected as well.   Long-acting stimulant use also implicated   Off zolpidem due to sleep walking (previous Rx through another provider)  - We discussed LATER bed times, regular wake times  - Short term Rx of zolpidem given  - Mental health referral made   - Note sent to PCP    Ahmet to follow up with Primary Care provider regarding elevated blood pressure.    Maddi Fam will follow up in about 6 month(s).       I spent 30 minutes with patient including counseling, and 25 minutes with chart review, and documentation

## 2023-08-02 NOTE — LETTER
[facility]  08/02/23  Patient: Maddi Fam  YOB: 1975  Medical Record Number: [id number]                                                                                  Non-Opioid Controlled Substance Agreement    This is an agreement between you and your provider regarding safe and appropriate use of controlled substances prescribed by your care team. Controlled substances are?medicines that can cause physical and mental dependence (abuse).     There are strict laws about having and using these medicines. We here at [facility] are  committed to working with you in your efforts to get better. To support you in this work, we'll help you schedule regular office appointments for medicine refills. If we must cancel or change your appointment for any reason, we'll make sure you have enough medicine to last until your next appointment.     As a Provider, I will:   Listen carefully to your concerns while treating you with respect.   Recommend a treatment plan that I believe is in your best interest and may involve therapies other than medicine.    Talk with you often about the possible benefits and the risk of harm of any medicine that we prescribe for you.  Assess the safety of this medicine and check how well it works.    Provide a plan on how to taper (discontinue or go off) using this medicine if the decision is made to stop its use.      ::  As a Patient, I understand controlled substances:     Are prescribed by my care provider to help me function or work and manage my condition(s).?  Are strong medicines and can cause serious side effects.     Need to be taken exactly as prescribed.?Combining controlled substances with certain medicines or chemicals (such as illegal drugs, alcohol, sedatives, sleeping pills, and benzodiazepines) can be dangerous or even fatal.? If I stop taking my medicines suddenly, I may have severe withdrawal symptoms.     The risks,  benefits, and side effects of these medicine(s) were explained to me. I agree that:    I will take part in other treatments as advised by my care team. This may be psychiatry or counseling, physical therapy, behavioral therapy, group treatment or a referral to specialist.    I will keep all my appointments and understand this is part of the monitoring of controlled substances.?My care team may require an office visit for EVERY controlled substance refill. If I miss appointments or don t follow instructions, my care team may stop my medicine    I will take my medicines as prescribed. I will not change the dose or schedule unless my care team tells me to. There will be no refills if I run out early.      I may be asked to come to the clinic and complete a urine drug test or complete a pill count. If I don t give a urine sample or participate in a pill count, the care team may stop my medicine.    I will only receive controlled substance prescriptions from this clinic. If I am treated by another provider, I will tell them that I am taking controlled substances and that I have a treatment agreement with this provider. I will inform my [facility] care team within one business day if I am given a prescription for any controlled substance by another healthcare provider. My [facility] care team can contact other providers and pharmacists about my use of any medicines.    It is up to me to make sure that I don't run out of my medicines on weekends or holidays.?If my care team is willing to refill my prescription without a visit, I must request refills only during office hours. Refills may take up to 3 business days to process. I will use one pharmacy to fill all my controlled substance prescriptions. I will notify the clinic about any changes to my insurance or medicine availability.    I am responsible for my prescriptions. If the medicine/prescription is lost, stolen or destroyed, it will not be replaced.?I  also agree not to share controlled substance medicines with anyone.     I am aware I should not use any illegal or recreational drugs. I agree not to drink alcohol unless my care team says I can.     If I enroll in the [place] Medical Cannabis program, I will tell my care team before my next refill.    I will tell my care team right away if I become pregnant, have a new medical problem treated outside of my regular clinic, or have a change in my medicines.     I understand that this medicine can affect my thinking, judgment and reaction time.? Alcohol and drugs affect the brain and body, which can affect the safety of my driving. Being under the influence of alcohol or drugs can affect my decision-making, behaviors, personal safety and the safety of others. Driving while impaired (DWI) can occur if a person is driving, operating or in physical control of a car, motorcycle, boat, snowmobile, ATV, motorbike, off-road vehicle or any other motor vehicle ([place] Statute 169A.20). I understand the risk if I choose to drive or operate any vehicle or machinery.    I understand that if I do not follow any of the conditions above, my prescriptions or treatment may be stopped or changed.   I agree that my provider, clinic care team and pharmacy may work with any city, state or federal law enforcement agency that investigates the misuse, sale or other diversion of my controlled medicine. I will allow my provider to discuss my care with, or share a copy of, this agreement with any other treating provider, pharmacy or emergency room where I receive care.     I have read this agreement and have asked questions about anything I did not understand.    ________________________________________________________  Patient Signature - Maddi Fam     ___________________                   Date     ________________________________________________________  Provider Signature - Jong Melgoza MD       ___________________                    Date     ________________________________________________________  Witness Signature (required if provider not present while patient signing)          ___________________                   Date

## 2023-08-02 NOTE — NURSING NOTE
6 month follow-up appointment with provider was schedule with patient. AVS printed and given to patient. Patient walked down to the [facility] lab for lab test.  Danuta Caceres CMA

## 2023-08-07 LAB
AMPHET UR CFM-MCNC: ABNORMAL NG/ML
AMPHET/CREAT UR: ABNORMAL

## 2023-09-17 ENCOUNTER — HEALTH MAINTENANCE LETTER (OUTPATIENT)
Age: 48
End: 2023-09-17

## 2023-12-02 ENCOUNTER — APPOINTMENT (OUTPATIENT)
Dept: CT IMAGING | Facility: HOSPITAL | Age: 48
End: 2023-12-02
Attending: EMERGENCY MEDICINE
Payer: COMMERCIAL

## 2023-12-02 ENCOUNTER — APPOINTMENT (OUTPATIENT)
Dept: ULTRASOUND IMAGING | Facility: HOSPITAL | Age: 48
End: 2023-12-02
Attending: EMERGENCY MEDICINE
Payer: COMMERCIAL

## 2023-12-02 ENCOUNTER — HOSPITAL ENCOUNTER (EMERGENCY)
Facility: HOSPITAL | Age: 48
Discharge: HOME | End: 2023-12-02
Attending: EMERGENCY MEDICINE | Admitting: EMERGENCY MEDICINE
Payer: COMMERCIAL

## 2023-12-02 ENCOUNTER — APPOINTMENT (OUTPATIENT)
Dept: RADIOLOGY | Facility: HOSPITAL | Age: 48
End: 2023-12-02
Attending: EMERGENCY MEDICINE
Payer: COMMERCIAL

## 2023-12-02 VITALS
HEIGHT: 66 IN | HEART RATE: 85 BPM | RESPIRATION RATE: 18 BRPM | BODY MASS INDEX: 30.12 KG/M2 | WEIGHT: 187.4 LBS | TEMPERATURE: 98.2 F | DIASTOLIC BLOOD PRESSURE: 102 MMHG | OXYGEN SATURATION: 98 % | SYSTOLIC BLOOD PRESSURE: 178 MMHG

## 2023-12-02 DIAGNOSIS — R11.2 NAUSEA, VOMITING AND DIARRHEA: ICD-10-CM

## 2023-12-02 DIAGNOSIS — R10.9 ABDOMINAL PAIN, UNSPECIFIED ABDOMINAL LOCATION: ICD-10-CM

## 2023-12-02 DIAGNOSIS — R19.7 NAUSEA, VOMITING AND DIARRHEA: ICD-10-CM

## 2023-12-02 LAB
ALBUMIN SERPL BCG-MCNC: 4.3 G/DL (ref 3.5–5.2)
ALBUMIN UR-MCNC: NEGATIVE MG/DL
ALP SERPL-CCNC: 96 U/L (ref 40–150)
ALT SERPL W P-5'-P-CCNC: 10 U/L (ref 0–50)
AMORPH CRY #/AREA URNS HPF: ABNORMAL /HPF
ANION GAP SERPL CALCULATED.3IONS-SCNC: 13 MMOL/L (ref 7–15)
APPEARANCE UR: CLEAR
AST SERPL W P-5'-P-CCNC: 18 U/L (ref 0–45)
BACTERIA #/AREA URNS HPF: ABNORMAL /HPF
BASOPHILS # BLD AUTO: 0.1 10E3/UL (ref 0–0.2)
BASOPHILS NFR BLD AUTO: 1 %
BILIRUB DIRECT SERPL-MCNC: <0.2 MG/DL (ref 0–0.3)
BILIRUB SERPL-MCNC: 0.5 MG/DL
BILIRUB UR QL STRIP: NEGATIVE
BUN SERPL-MCNC: 18.9 MG/DL (ref 6–20)
CALCIUM SERPL-MCNC: 9.3 MG/DL (ref 8.6–10)
CHLORIDE SERPL-SCNC: 106 MMOL/L (ref 98–107)
COLOR UR AUTO: ABNORMAL
CREAT SERPL-MCNC: 0.85 MG/DL (ref 0.51–0.95)
D DIMER PPP FEU-MCNC: 0.32 UG/ML FEU (ref 0–0.5)
DEPRECATED HCO3 PLAS-SCNC: 19 MMOL/L (ref 22–29)
EGFRCR SERPLBLD CKD-EPI 2021: 84 ML/MIN/1.73M2
EOSINOPHIL # BLD AUTO: 0.2 10E3/UL (ref 0–0.7)
EOSINOPHIL NFR BLD AUTO: 2 %
ERYTHROCYTE [DISTWIDTH] IN BLOOD BY AUTOMATED COUNT: 13.2 % (ref 10–15)
GLUCOSE SERPL-MCNC: 91 MG/DL (ref 70–99)
GLUCOSE UR STRIP-MCNC: NEGATIVE MG/DL
HCG UR QL: NEGATIVE
HCT VFR BLD AUTO: 37.7 % (ref 35–47)
HGB BLD-MCNC: 13.1 G/DL (ref 11.7–15.7)
HGB UR QL STRIP: NEGATIVE
IMM GRANULOCYTES # BLD: 0 10E3/UL
IMM GRANULOCYTES NFR BLD: 0 %
KETONES UR STRIP-MCNC: NEGATIVE MG/DL
LEUKOCYTE ESTERASE UR QL STRIP: NEGATIVE
LIPASE SERPL-CCNC: 40 U/L (ref 13–60)
LYMPHOCYTES # BLD AUTO: 1.7 10E3/UL (ref 0.8–5.3)
LYMPHOCYTES NFR BLD AUTO: 18 %
MCH RBC QN AUTO: 31.7 PG (ref 26.5–33)
MCHC RBC AUTO-ENTMCNC: 34.7 G/DL (ref 31.5–36.5)
MCV RBC AUTO: 91 FL (ref 78–100)
MONOCYTES # BLD AUTO: 0.5 10E3/UL (ref 0–1.3)
MONOCYTES NFR BLD AUTO: 5 %
NEUTROPHILS # BLD AUTO: 7 10E3/UL (ref 1.6–8.3)
NEUTROPHILS NFR BLD AUTO: 74 %
NITRATE UR QL: NEGATIVE
NRBC # BLD AUTO: 0 10E3/UL
NRBC BLD AUTO-RTO: 0 /100
PH UR STRIP: 7.5 [PH] (ref 5–7)
PLATELET # BLD AUTO: 318 10E3/UL (ref 150–450)
POTASSIUM SERPL-SCNC: 4.2 MMOL/L (ref 3.4–5.3)
PROT SERPL-MCNC: 6.7 G/DL (ref 6.4–8.3)
RBC # BLD AUTO: 4.13 10E6/UL (ref 3.8–5.2)
RBC URINE: <1 /HPF
SODIUM SERPL-SCNC: 138 MMOL/L (ref 135–145)
SP GR UR STRIP: 1.01 (ref 1–1.03)
SQUAMOUS EPITHELIAL: 5 /HPF
TROPONIN T SERPL HS-MCNC: 7 NG/L
UROBILINOGEN UR STRIP-MCNC: <2 MG/DL
WBC # BLD AUTO: 9.6 10E3/UL (ref 4–11)
WBC URINE: 1 /HPF

## 2023-12-02 PROCEDURE — 81025 URINE PREGNANCY TEST: CPT | Performed by: EMERGENCY MEDICINE

## 2023-12-02 PROCEDURE — 93005 ELECTROCARDIOGRAM TRACING: CPT | Performed by: STUDENT IN AN ORGANIZED HEALTH CARE EDUCATION/TRAINING PROGRAM

## 2023-12-02 PROCEDURE — 76705 ECHO EXAM OF ABDOMEN: CPT

## 2023-12-02 PROCEDURE — 250N000011 HC RX IP 250 OP 636: Mod: JZ | Performed by: EMERGENCY MEDICINE

## 2023-12-02 PROCEDURE — 82248 BILIRUBIN DIRECT: CPT | Performed by: EMERGENCY MEDICINE

## 2023-12-02 PROCEDURE — 85025 COMPLETE CBC W/AUTO DIFF WBC: CPT | Performed by: EMERGENCY MEDICINE

## 2023-12-02 PROCEDURE — 83690 ASSAY OF LIPASE: CPT | Performed by: EMERGENCY MEDICINE

## 2023-12-02 PROCEDURE — 99285 EMERGENCY DEPT VISIT HI MDM: CPT | Mod: 25

## 2023-12-02 PROCEDURE — 71046 X-RAY EXAM CHEST 2 VIEWS: CPT

## 2023-12-02 PROCEDURE — 74177 CT ABD & PELVIS W/CONTRAST: CPT

## 2023-12-02 PROCEDURE — 96374 THER/PROPH/DIAG INJ IV PUSH: CPT | Mod: 59

## 2023-12-02 PROCEDURE — 84484 ASSAY OF TROPONIN QUANT: CPT | Performed by: EMERGENCY MEDICINE

## 2023-12-02 PROCEDURE — 93005 ELECTROCARDIOGRAM TRACING: CPT | Performed by: EMERGENCY MEDICINE

## 2023-12-02 PROCEDURE — 96375 TX/PRO/DX INJ NEW DRUG ADDON: CPT

## 2023-12-02 PROCEDURE — 80053 COMPREHEN METABOLIC PANEL: CPT | Performed by: EMERGENCY MEDICINE

## 2023-12-02 PROCEDURE — 250N000011 HC RX IP 250 OP 636: Performed by: EMERGENCY MEDICINE

## 2023-12-02 PROCEDURE — 36415 COLL VENOUS BLD VENIPUNCTURE: CPT | Performed by: EMERGENCY MEDICINE

## 2023-12-02 PROCEDURE — 81001 URINALYSIS AUTO W/SCOPE: CPT | Performed by: EMERGENCY MEDICINE

## 2023-12-02 PROCEDURE — 85379 FIBRIN DEGRADATION QUANT: CPT | Performed by: EMERGENCY MEDICINE

## 2023-12-02 RX ORDER — FENTANYL CITRATE 50 UG/ML
25 INJECTION, SOLUTION INTRAMUSCULAR; INTRAVENOUS ONCE
Status: COMPLETED | OUTPATIENT
Start: 2023-12-02 | End: 2023-12-02

## 2023-12-02 RX ORDER — HYDROCODONE BITARTRATE AND ACETAMINOPHEN 5; 325 MG/1; MG/1
1 TABLET ORAL EVERY 8 HOURS PRN
Qty: 6 TABLET | Refills: 0 | Status: SHIPPED | OUTPATIENT
Start: 2023-12-02 | End: 2024-02-07

## 2023-12-02 RX ORDER — ONDANSETRON 2 MG/ML
4 INJECTION INTRAMUSCULAR; INTRAVENOUS ONCE
Status: COMPLETED | OUTPATIENT
Start: 2023-12-02 | End: 2023-12-02

## 2023-12-02 RX ORDER — IOPAMIDOL 755 MG/ML
78 INJECTION, SOLUTION INTRAVASCULAR ONCE
Status: COMPLETED | OUTPATIENT
Start: 2023-12-02 | End: 2023-12-02

## 2023-12-02 RX ORDER — ONDANSETRON 4 MG/1
4 TABLET, ORALLY DISINTEGRATING ORAL EVERY 8 HOURS PRN
Qty: 10 TABLET | Refills: 0 | Status: SHIPPED | OUTPATIENT
Start: 2023-12-02

## 2023-12-02 RX ADMIN — HYDROMORPHONE HYDROCHLORIDE 1 MG: 1 INJECTION, SOLUTION INTRAMUSCULAR; INTRAVENOUS; SUBCUTANEOUS at 17:17

## 2023-12-02 RX ADMIN — ONDANSETRON 4 MG: 2 INJECTION INTRAMUSCULAR; INTRAVENOUS at 14:58

## 2023-12-02 RX ADMIN — IOPAMIDOL 78 ML: 755 INJECTION, SOLUTION INTRAVENOUS at 17:48

## 2023-12-02 RX ADMIN — FENTANYL CITRATE 25 MCG: 50 INJECTION, SOLUTION INTRAMUSCULAR; INTRAVENOUS at 16:14

## 2023-12-02 ASSESSMENT — ACTIVITIES OF DAILY LIVING (ADL)
ADLS_ACUITY_SCORE: 35
ADLS_ACUITY_SCORE: 35

## 2023-12-02 NOTE — ED PROVIDER NOTES
"  Emergency Department Encounter     Evaluation Date & Time:   2023  2:29 PM    CHIEF COMPLAINT:  Abdominal Pain, Vomiting, and Diarrhea      Triage Note:Patient has been having \"sharp\" pain under the left breast that has been on and off for a couple weeks. Patient has been having diarrhea with vomiting with food ingestions. Has gallbladder.     Patient does have breast implants, and the left has gone flat     Triage Assessment (Adult)       Row Name 23 1425          Triage Assessment    Airway WDL WDL        Respiratory WDL    Respiratory WDL WDL        Skin Circulation/Temperature WDL    Skin Circulation/Temperature WDL WDL        Cardiac WDL    Cardiac WDL WDL        Peripheral/Neurovascular WDL    Peripheral Neurovascular WDL WDL        Cognitive/Neuro/Behavioral WDL    Cognitive/Neuro/Behavioral WDL WDL                       Impression and Plan       FINAL IMPRESSION:    ICD-10-CM    1. Abdominal pain, unspecified abdominal location  R10.9       2. Nausea, vomiting and diarrhea  R11.2     R19.7           ED COURSE & MEDICAL DECISION MAKIN year old female, history of ureteral stone, s/p appendectomy and s/p  section, who presents for evaluation of deep burning upper, primarily epigastric, abdominal pain that has been intermittent x one week associated with multiple episodes of vomiting and diarrhea.  She also has noted blood in her urine, but denies dysuria.  No fevers.    On exam, abdomen is soft with mild to moderate tenderness to palpation epigastrium and mild tenderness to palpation RUQ and LUQ; no peritoneal signs or CVAT, BL.    IV access established, blood sent for labs and patient was given IV Zofran and IV fentanyl.    Given upper abdominal pain with some radiation to her chest, cardiac etiology considered.  EKG performed and demonstrated sinus bradycardia with T wave inversion in lead aVL and no ST-T wave elevation consistent with ACS or pericarditis.  Troponin WNL (7); a " single, normal troponin is reassuring that symptoms are not secondary to ACS given that they have been ongoing for one week and I do not think serial troponin testing is indicated.    Given pleuritic component to her pain with shortness of breath, PE considered.  Patient not tachycardic nor hypoxic and has low probability of PE.  D-dimer checked and WNL (0.32).  Risks of CTA chest felt to outweigh benefit.  CXR performed and was normal.    UPT negative.  UA negative for infection and hematuria.    Labs otherwise remarkable for no leukocytosis, anemia, significant electrolyte derangements or renal impairment.  No laboratory evidence of hepatitis, biliary obstruction or pancreatitis.    Given predominantly upper abdominal pain, biliary etiology was considered for which ultrasound was performed and with no acute findings; there is small adenomyomatosis of the gallbladder with gallbladder otherwise unremarkable.    Ultrasound imaging followed by CT abdomen / pelvis, which demonstrated a 3 mm stone at the inferior pole of the left kidney with no associated hydronephrosis, other stones or other significant findings.    Consider gastroenteritis.    Patient's vitals and evaluation are reassuring and I do not think admission is indicated.  She was discharged to home with follow-up with her PCP.  She was given a prescription for ODT Zofran and a very small number of Norco.  Return precautions provided.  Patient stable throughout ED course.    At the conclusion of the encounter I discussed the results of all the tests and the disposition. The questions were answered. The patient and family acknowledged understanding and were agreeable with the care plan.    Medical Decision Making    History:  Supplemental history from: Documented in chart, if applicable  External Record(s) reviewed: Documented in chart, if applicable.    Work Up:  Chart documentation includes differential considered and any EKGs or imaging independently  interpreted by provider, where specified.  In additional to work up documented, I considered the following work up: Documented in chart, if applicable.    External consultation:  Discussion of management with another provider: Documented in chart, if applicable    Complicating factors:  Care impacted by chronic illness: Other: Obesity  Care affected by social determinants of health: N/A    Disposition considerations: Discharge. I prescribed additional prescription strength medication(s) as charted. I considered admission, but ultimately discharged patient given reassuring evaluation.      MEDICATIONS GIVEN IN THE EMERGENCY DEPARTMENT:  Medications   ondansetron (ZOFRAN) injection 4 mg (4 mg Intravenous $Given 23 1458)   fentaNYL (PF) (SUBLIMAZE) injection 25 mcg (25 mcg Intravenous $Given 23 1614)   HYDROmorphone (DILAUDID) injection 1 mg (1 mg Intravenous $Given 23 1717)   iopamidol (ISOVUE-370) solution 78 mL (78 mLs Intravenous $Given 23 9934)       NEW PRESCRIPTIONS STARTED AT TODAY'S ED VISIT:  Discharge Medication List as of 2023  6:34 PM        START taking these medications    Details   HYDROcodone-acetaminophen (NORCO) 5-325 MG tablet Take 1 tablet by mouth every 8 hours as needed for severe pain, Disp-6 tablet, R-0, Local Print      !! ondansetron (ZOFRAN ODT) 4 MG ODT tab Take 1 tablet (4 mg) by mouth every 8 hours as needed for vomiting or nausea, Disp-10 tablet, R-0, Local Print       !! - Potential duplicate medications found. Please discuss with provider.          HPI     HPI     Maddi Kim is a 48 year old female, history of ureteral stone, s/p appendectomy and s/p  section, who presents to this ED ambulatory for evaluation of abdominal pain.    The pain started one week ago and has been intermittent since onset with the episodes of pain lasting up to ~45 minutes when they occur. The pain is located predominantly in the epigastrium, but at times radiates to  "the RUQ and LUQ wrapping around her bra strap area. The pain occasionally also radiates to her chest and down to her lower abdomen. The pain feels like a deep burning in nature and like a \"starvation\" pain. The pain is sometimes worse with deep inspiration. She denies that the pain worsens with eating, however she reports decreased appetite with the pain. She reports associated nausea with multiple episodes of vomiting through the week. No hematemesis. She also has had multiple episodes of diarrhea. She has had blood in her stools, which she thought might partly be related to hemorrhoids. She also has noted blood in her urine; denies dysuria. No fevers.    She otherwise reports some shortness of breath. No URI symptoms.    REVIEW OF SYSTEMS:  All other systems reviewed and are negative.      Medical History     Past Medical History:   Diagnosis Date    Depressive disorder 2023    Kidney stone 2020       Past Surgical History:   Procedure Laterality Date    APPENDECTOMY      BREAST SURGERY      augmentation    BREAST SURGERY       SECTION       SECTION      DENTAL SURGERY      wisdom teeth    TONSILLECTOMY      TUBAL LIGATION      TUBAL LIGATION  2009       Family History   Problem Relation Age of Onset    Hypertension Mother     Dementia Father     Breast Cancer No family hx of     Colon Cancer No family hx of     Diabetes No family hx of     Cerebrovascular Disease No family hx of     Heart Disease No family hx of        Social History     Tobacco Use    Smoking status: Every Day     Packs/day: .75     Types: Cigarettes    Smokeless tobacco: Never    Tobacco comments:     Approved for medical marijuana   Vaping Use    Vaping Use: Never used   Substance Use Topics    Alcohol use: Yes    Drug use: No       HYDROcodone-acetaminophen (NORCO) 5-325 MG tablet  ondansetron (ZOFRAN ODT) 4 MG ODT tab  acetaminophen (TYLENOL) 650 MG CR tablet  ALPRAZolam (XANAX) 1 MG " "tablet  amphetamine-dextroamphetamine (ADDERALL XR) 30 MG 24 hr capsule  amphetamine-dextroamphetamine (ADDERALL XR) 30 MG 24 hr capsule  amphetamine-dextroamphetamine (ADDERALL XR) 30 MG 24 hr capsule  amphetamine-dextroamphetamine (ADDERALL) 10 MG tablet  amphetamine-dextroamphetamine (ADDERALL) 10 MG tablet  amphetamine-dextroamphetamine (ADDERALL) 10 MG tablet  amphetamine-dextroamphetamine (ADDERALL) 10 MG tablet  buPROPion (WELLBUTRIN XL) 300 MG 24 hr tablet  celecoxib (CELEBREX) 200 MG capsule  DULoxetine (CYMBALTA) 60 MG capsule  estradiol (ESTRACE) 0.5 MG tablet  ibuprofen (ADVIL/MOTRIN) 800 MG tablet  losartan (COZAAR) 100 MG tablet  metoprolol succinate ER (TOPROL XL) 50 MG 24 hr tablet  naproxen (NAPROSYN) 500 MG tablet  ondansetron (ZOFRAN ODT) 4 MG ODT tab  potassium chloride (KAYCIEL) 20 MEQ/15ML (10%) solution  potassium chloride (KAYCIEL) 20 MEQ/15ML (10%) solution  progesterone (PROMETRIUM) 100 MG capsule  SUMAtriptan (IMITREX) 100 MG tablet  tiZANidine (ZANAFLEX) 4 MG tablet  topiramate (TOPAMAX) 100 MG tablet  zolpidem (AMBIEN) 10 MG tablet        Physical Exam     First Vitals:  Patient Vitals for the past 24 hrs:   BP Temp Temp src Pulse Resp SpO2 Height Weight   12/02/23 1843 (!) 178/102 -- -- 85 18 98 % -- --   12/02/23 1830 (!) 178/105 -- -- 66 21 98 % -- --   12/02/23 1800 (!) 188/121 -- -- 67 25 98 % -- --   12/02/23 1715 (!) 163/117 -- -- 61 27 98 % -- --   12/02/23 1700 (!) 175/120 -- -- 67 28 99 % -- --   12/02/23 1630 (!) 166/101 -- -- 68 22 99 % -- --   12/02/23 1530 (!) 172/98 -- -- 61 25 98 % -- --   12/02/23 1500 (!) 162/102 -- -- 60 14 99 % -- --   12/02/23 1428 (!) 186/118 -- -- -- -- -- -- --   12/02/23 1427 -- 98.2  F (36.8  C) Temporal 63 16 98 % -- --   12/02/23 1424 -- -- -- -- -- -- 1.676 m (5' 6\") 85 kg (187 lb 6.4 oz)       PHYSICAL EXAM:   Physical Exam    GENERAL: Awake, alert.  In mild acute distress.   HEENT: Normocephalic, atraumatic.   NECK: No stridor.  PULMONARY: " Symmetrical breath sounds without distress.  Lungs clear to auscultation bilaterally without wheezes, rhonchi or rales.  CARDIO: Regular rate and rhythm.  No significant murmur, rub or gallop.  Radial pulses strong and symmetrical.  ABDOMINAL: Abdomen soft, non-distended with mild to moderate tenderness to palpation epigastrium and mild tenderness to palpation RUQ and LUQ; no rebound tenderness or guarding. No CVAT, BL.  EXTREMITIES: No lower extremity swelling or edema.      NEURO: Alert and oriented to person, place and time.  Cranial nerves grossly intact.  No focal motor deficit.  PSYCH: Normal mood and affect.      Results     LAB:  All pertinent labs reviewed and interpreted  Labs Ordered and Resulted from Time of ED Arrival to Time of ED Departure   COMPREHENSIVE METABOLIC PANEL - Abnormal       Result Value    Sodium 138      Potassium 4.2      Carbon Dioxide (CO2) 19 (*)     Anion Gap 13      Urea Nitrogen 18.9      Creatinine 0.85      GFR Estimate 84      Calcium 9.3      Chloride 106      Glucose 91      Alkaline Phosphatase 96      AST 18      ALT 10      Protein Total 6.7      Albumin 4.3      Bilirubin Total 0.5     ROUTINE UA WITH MICROSCOPIC REFLEX TO CULTURE - Abnormal    Color Urine Light Yellow      Appearance Urine Clear      Glucose Urine Negative      Bilirubin Urine Negative      Ketones Urine Negative      Specific Gravity Urine 1.011      Blood Urine Negative      pH Urine 7.5 (*)     Protein Albumin Urine Negative      Urobilinogen Urine <2.0      Nitrite Urine Negative      Leukocyte Esterase Urine Negative      Bacteria Urine Few (*)     Amorphous Crystals Urine Few (*)     RBC Urine <1      WBC Urine 1      Squamous Epithelials Urine 5 (*)    LIPASE - Normal    Lipase 40     BILIRUBIN DIRECT - Normal    Bilirubin Direct <0.20     HCG QUALITATIVE URINE - Normal    hCG Urine Qualitative Negative     D DIMER QUANTITATIVE - Normal    D-Dimer Quantitative 0.32     TROPONIN T, HIGH  SENSITIVITY - Normal    Troponin T, High Sensitivity 7     CBC WITH PLATELETS AND DIFFERENTIAL    WBC Count 9.6      RBC Count 4.13      Hemoglobin 13.1      Hematocrit 37.7      MCV 91      MCH 31.7      MCHC 34.7      RDW 13.2      Platelet Count 318      % Neutrophils 74      % Lymphocytes 18      % Monocytes 5      % Eosinophils 2      % Basophils 1      % Immature Granulocytes 0      NRBCs per 100 WBC 0      Absolute Neutrophils 7.0      Absolute Lymphocytes 1.7      Absolute Monocytes 0.5      Absolute Eosinophils 0.2      Absolute Basophils 0.1      Absolute Immature Granulocytes 0.0      Absolute NRBCs 0.0         RADIOLOGY:  Chest XR,  PA & LAT   Final Result   IMPRESSION: Negative chest.      CT Abdomen Pelvis w Contrast   Final Result   IMPRESSION:    1.  3 mm stone noted at the inferior pole of the left kidney. No hydronephrosis. No other stones are identified.      Abdomen US, limited (RUQ only)   Final Result   IMPRESSION:   1.  No acute findings in the upper abdomen. Small adenomyomatosis of the gallbladder, gallbladder otherwise unremarkable.              EC2023, 14:43; sinus bradycardia with rate of 56 bpm; normal intervals; normal conduction; T wave inversions lead aVL with no ST-T wave elevation consistent with ACS or pericarditis; no previous EKG available for comparison    EKG independently reviewed and interpreted by MD Polly Campos MD  Emergency Medicine  [facility] EMERGENCY DEPARTMENT           Polly Walters MD  23 [phone]

## 2023-12-02 NOTE — ED TRIAGE NOTES
"Patient has been having \"sharp\" pain under the left breast that has been on and off for a couple weeks. Patient has been having diarrhea with vomiting with food ingestions. Has gallbladder.     Patient does have breast implants, and the left has gone flat     Triage Assessment (Adult)       Row Name 12/02/23 1425          Triage Assessment    Airway WDL WDL        Respiratory WDL    Respiratory WDL WDL        Skin Circulation/Temperature WDL    Skin Circulation/Temperature WDL WDL        Cardiac WDL    Cardiac WDL WDL        Peripheral/Neurovascular WDL    Peripheral Neurovascular WDL WDL        Cognitive/Neuro/Behavioral WDL    Cognitive/Neuro/Behavioral WDL WDL                     "

## 2023-12-03 NOTE — DISCHARGE INSTRUCTIONS
Please follow-up with your Primary Care Provider within 3-4 days for a recheck; call to arrange appointment.    Return to the ER for worsening symptoms, worsening abdominal pain, persistent vomiting, worsening diarrhea, fever or other concerns.    Drink frequent, small sips of fluids (such as Pedialyte or Propel water) to stay hydrated.    Do not drink alcohol, drive or take other products that contain tylenol (acetaminophen) while using the norco.

## 2023-12-20 DIAGNOSIS — G47.419 NARCOLEPSY WITHOUT CATAPLEXY(347.00): Chronic | ICD-10-CM

## 2023-12-20 NOTE — TELEPHONE ENCOUNTER
[facility] that went to [facility] needs to be cancelled because they will not transfer the RX anywhere and it is not the correct dosage.    Please Send RX to [facility] in [place] off [address]    Please correct the dosage as pt takes 10 mg- short acting she needs 180 for the month    If its long acting she needs 30 mg- 1 twice a day so 60 for the month.  Please send these over for pt.

## 2023-12-21 RX ORDER — DEXTROAMPHETAMINE SACCHARATE, AMPHETAMINE ASPARTATE, DEXTROAMPHETAMINE SULFATE AND AMPHETAMINE SULFATE 2.5; 2.5; 2.5; 2.5 MG/1; MG/1; MG/1; MG/1
20-30 TABLET ORAL 2 TIMES DAILY PRN
Qty: 180 TABLET | Refills: 0 | Status: SHIPPED | OUTPATIENT
Start: 2023-12-21 | End: 2024-01-12

## 2023-12-21 RX ORDER — DEXTROAMPHETAMINE SACCHARATE, AMPHETAMINE ASPARTATE MONOHYDRATE, DEXTROAMPHETAMINE SULFATE AND AMPHETAMINE SULFATE 7.5; 7.5; 7.5; 7.5 MG/1; MG/1; MG/1; MG/1
30 CAPSULE, EXTENDED RELEASE ORAL 2 TIMES DAILY
Qty: 60 CAPSULE | Refills: 0 | Status: SHIPPED | OUTPATIENT
Start: 2023-12-21 | End: 2024-02-07

## 2023-12-21 NOTE — TELEPHONE ENCOUNTER
Ramu 12/14/23, patient requested dosage change. This is what she was expecting at the pharmacy instead of old dosage of Adderall XR 30 once daily.

## 2023-12-21 NOTE — TELEPHONE ENCOUNTER
Message left at [facility] to cancel both prescriptions    Prescriptions pended with preferred pharmacy. Sig updated to twice daily for Adderal XR 30 mg    Gayle RANGEL RN  [facility]

## 2023-12-21 NOTE — TELEPHONE ENCOUNTER
How did the wrong dosage get sent?  I do not have time to trouble shoot and review can you please look into it>  When a refill is done it should be off the med list not re-entered

## 2024-02-06 PROBLEM — Z79.899 CONTROLLED SUBSTANCE AGREEMENT SIGNED: Chronic | Status: ACTIVE | Noted: 2022-02-09

## 2024-02-07 ENCOUNTER — OFFICE VISIT (OUTPATIENT)
Dept: SLEEP MEDICINE | Facility: CLINIC | Age: 49
End: 2024-02-07
Payer: COMMERCIAL

## 2024-02-07 VITALS
WEIGHT: 192.4 LBS | HEIGHT: 66 IN | SYSTOLIC BLOOD PRESSURE: 150 MMHG | HEART RATE: 59 BPM | BODY MASS INDEX: 30.92 KG/M2 | DIASTOLIC BLOOD PRESSURE: 96 MMHG | OXYGEN SATURATION: 99 %

## 2024-02-07 DIAGNOSIS — E66.09 CLASS 1 OBESITY DUE TO EXCESS CALORIES WITHOUT SERIOUS COMORBIDITY WITH BODY MASS INDEX (BMI) OF 30.0 TO 30.9 IN ADULT: Chronic | ICD-10-CM

## 2024-02-07 DIAGNOSIS — G47.419 NARCOLEPSY WITHOUT CATAPLEXY(347.00): Chronic | ICD-10-CM

## 2024-02-07 DIAGNOSIS — F51.04 CHRONIC INSOMNIA: ICD-10-CM

## 2024-02-07 DIAGNOSIS — E66.811 CLASS 1 OBESITY DUE TO EXCESS CALORIES WITHOUT SERIOUS COMORBIDITY WITH BODY MASS INDEX (BMI) OF 30.0 TO 30.9 IN ADULT: Chronic | ICD-10-CM

## 2024-02-07 DIAGNOSIS — G47.33 OSA (OBSTRUCTIVE SLEEP APNEA): Primary | Chronic | ICD-10-CM

## 2024-02-07 PROCEDURE — 99214 OFFICE O/P EST MOD 30 MIN: CPT | Performed by: INTERNAL MEDICINE

## 2024-02-07 RX ORDER — DEXTROAMPHETAMINE SACCHARATE, AMPHETAMINE ASPARTATE MONOHYDRATE, DEXTROAMPHETAMINE SULFATE AND AMPHETAMINE SULFATE 7.5; 7.5; 7.5; 7.5 MG/1; MG/1; MG/1; MG/1
30 CAPSULE, EXTENDED RELEASE ORAL 2 TIMES DAILY
Qty: 60 CAPSULE | Refills: 0 | Status: SHIPPED | OUTPATIENT
Start: 2024-04-11 | End: 2024-07-01

## 2024-02-07 RX ORDER — DEXTROAMPHETAMINE SACCHARATE, AMPHETAMINE ASPARTATE, DEXTROAMPHETAMINE SULFATE AND AMPHETAMINE SULFATE 5; 5; 5; 5 MG/1; MG/1; MG/1; MG/1
20-30 TABLET ORAL 2 TIMES DAILY PRN
Qty: 60 TABLET | Refills: 0 | Status: SHIPPED | OUTPATIENT
Start: 2024-02-11 | End: 2024-07-08

## 2024-02-07 RX ORDER — DEXTROAMPHETAMINE SACCHARATE, AMPHETAMINE ASPARTATE, DEXTROAMPHETAMINE SULFATE AND AMPHETAMINE SULFATE 5; 5; 5; 5 MG/1; MG/1; MG/1; MG/1
20-30 TABLET ORAL 2 TIMES DAILY PRN
Qty: 60 TABLET | Refills: 0 | Status: SHIPPED | OUTPATIENT
Start: 2024-03-12 | End: 2024-07-01

## 2024-02-07 RX ORDER — DEXTROAMPHETAMINE SACCHARATE, AMPHETAMINE ASPARTATE, DEXTROAMPHETAMINE SULFATE AND AMPHETAMINE SULFATE 5; 5; 5; 5 MG/1; MG/1; MG/1; MG/1
20-30 TABLET ORAL 2 TIMES DAILY PRN
Qty: 60 TABLET | Refills: 0 | Status: SHIPPED | OUTPATIENT
Start: 2024-04-11 | End: 2024-07-01

## 2024-02-07 RX ORDER — DEXTROAMPHETAMINE SACCHARATE, AMPHETAMINE ASPARTATE MONOHYDRATE, DEXTROAMPHETAMINE SULFATE AND AMPHETAMINE SULFATE 7.5; 7.5; 7.5; 7.5 MG/1; MG/1; MG/1; MG/1
30 CAPSULE, EXTENDED RELEASE ORAL 2 TIMES DAILY
Qty: 60 CAPSULE | Refills: 0 | Status: SHIPPED | OUTPATIENT
Start: 2024-03-12 | End: 2024-07-01

## 2024-02-07 RX ORDER — DEXTROAMPHETAMINE SACCHARATE, AMPHETAMINE ASPARTATE MONOHYDRATE, DEXTROAMPHETAMINE SULFATE AND AMPHETAMINE SULFATE 7.5; 7.5; 7.5; 7.5 MG/1; MG/1; MG/1; MG/1
30 CAPSULE, EXTENDED RELEASE ORAL 2 TIMES DAILY
Qty: 60 CAPSULE | Refills: 0 | Status: SHIPPED | OUTPATIENT
Start: 2024-02-11 | End: 2024-07-08

## 2024-02-07 ASSESSMENT — SLEEP AND FATIGUE QUESTIONNAIRES
HOW LIKELY ARE YOU TO NOD OFF OR FALL ASLEEP WHILE LYING DOWN TO REST IN THE AFTERNOON WHEN CIRCUMSTANCES PERMIT: HIGH CHANCE OF DOZING
HOW LIKELY ARE YOU TO NOD OFF OR FALL ASLEEP WHILE SITTING INACTIVE IN A PUBLIC PLACE: MODERATE CHANCE OF DOZING
HOW LIKELY ARE YOU TO NOD OFF OR FALL ASLEEP WHILE SITTING AND READING: MODERATE CHANCE OF DOZING
HOW LIKELY ARE YOU TO NOD OFF OR FALL ASLEEP WHILE SITTING QUIETLY AFTER LUNCH WITHOUT ALCOHOL: MODERATE CHANCE OF DOZING
HOW LIKELY ARE YOU TO NOD OFF OR FALL ASLEEP WHILE SITTING AND TALKING TO SOMEONE: SLIGHT CHANCE OF DOZING
HOW LIKELY ARE YOU TO NOD OFF OR FALL ASLEEP WHEN YOU ARE A PASSENGER IN A CAR FOR AN HOUR WITHOUT A BREAK: HIGH CHANCE OF DOZING
HOW LIKELY ARE YOU TO NOD OFF OR FALL ASLEEP IN A CAR, WHILE STOPPED FOR A FEW MINUTES IN TRAFFIC: SLIGHT CHANCE OF DOZING
HOW LIKELY ARE YOU TO NOD OFF OR FALL ASLEEP WHILE WATCHING TV: MODERATE CHANCE OF DOZING

## 2024-02-07 ASSESSMENT — PATIENT HEALTH QUESTIONNAIRE - PHQ9
10. IF YOU CHECKED OFF ANY PROBLEMS, HOW DIFFICULT HAVE THESE PROBLEMS MADE IT FOR YOU TO DO YOUR WORK, TAKE CARE OF THINGS AT HOME, OR GET ALONG WITH OTHER PEOPLE: VERY DIFFICULT
SUM OF ALL RESPONSES TO PHQ QUESTIONS 1-9: 17
SUM OF ALL RESPONSES TO PHQ QUESTIONS 1-9: 17

## 2024-02-07 NOTE — PATIENT INSTRUCTIONS

## 2024-02-07 NOTE — PROGRESS NOTES
Medication Follow-Up Visit:    Chief Complaint   Patient presents with    Medication Follow-up       Maddi Fam for follow-up of narcolepsy, insomnia and mild obstructive sleep apnea     The patient was diagnosed with narcolepsy in 2004 at [facility]. Initial PSG in 2002 did not show any sleep disordered breathing. She represented to Rajat Oswald at [facility] in 2004 and reported her sleepiness improved and then subsequently reoccurred. She has a second sleep study 6/14/04 (145#) that again showed a normal AHI (no BLAKE). She completed an MSLT the following day (6/15/04) and this showed a mean latency of 6.3 minutes and 4 SOREMs in 5 naps     There does not appear to be actigraphy, subjective history regarding total sleep time, or urine drug screen done in 2004. Likewise medication list from that time is not available.     Unclear if/where she was treated from 0583-0300     She says she was treated with Xyrem for a year in late 2000s, but had small children and  worked nights.     Patient was seen by Jerson Brown at [facility] 07/06/2018 to establish care for narcolepsy with cataplexy taking Adderall XR 30 mg PO two times a day XR 30 mg short acting two times a day (120 mg per day but on most days but on some days 90 mg). The patient described brief moments of cataplexy that occur in infrequent clusters. She described brief episodes during which she feels that she is going to fall or he head is weak.     Her care was transferred to Dr. Whitman 1/2019 where concerns regarding higher than normal doses of Adderall was expressed. She described her cataplexy as feeling like her medication is not working and her body is shutting down. Then laughter, or emotion or tiredness brings it on. Then she gets feelings of extreme sleepiness and sleep attack. It was felt that these episodes were sleep attacks NOT cataplexy.     [employer] testing 1/29/2019   COMT genotype is associated with reduced therapeutic response to  this drug: Amphetamine salts, dexmethylphendiate, dextroamphetamine, lisdexamfetamine, methylphenidate     In 2/2019 she entered into [employer] study, but it did not seem effective and was associated with weight gain     Care transferred to Jong Melgoza MD 4/8/2020. Given weight gain, snoring, sleep maintenance difficulties, long sleep times and the fact we are considering Xyrem we recommended a Sleep ApneaTest to rule out interval development of obstructive sleep apnea     Study Date: 4/27/2021 (195.0 lbs) Respiration: scored using 3% rules  - Sleep was disrupted by arousals. Reduced REM sleep was noted. Dense spindle activity and alpha intrusions were noted.   - Apnea/hypopnea index was 12.9 events per hour (central apnea/hypopnea index was 0.5 events per hour). The REM AHI was 6.7 events per hour. The supine AHI was 12.0 events per hour. The RERA index was 5.6 events per hour. The RDI was 18.5 events per hour.  - Lowest oxygen saturation was 88.0%. Time spent less than or equal to 88% was 0 minutes. Time spent less than or equal to 89% was 0 minutes.  - PLM index was 0.7 movements per hour. The PLM Arousal Index was 0.4 per hour.    She did not tolerate CPAP Because she woke up feeling suffocated after 3-4 hours. She would take it off. She tried using for 1 month.     At 2/2022 visit we recommended retrial CPAP. She tried 9 nights and did not tolerate it due to awakening feeling suffocated. She appeared to be having wthdrawal symptoms on her drug holidays. Changed adderall XR to Vyvanse. Encouraged a long drug holiday. She went almost 3 weeks without any stimulants which was horrible but afterwards was managing well with less medication.    She was referred for a mandibular advancement device 8/2022  Vyavanse was not effective and she was changed back to Adderall XR at reduced dose of 30 mg in 8/2022    No unusual Rx in [facility] database 8/1/2023   Urine drug screen with expected findings 2/2022,  8/2023    Patient also takes Xanaz    Since last evaluation by myself she called and requested that her long-acting stimulant dose be increased back to 60mg     Overall, the patient reports they are doing fair.    During work days bedtime is typically 9. They are typically getting out of bed at 7.    On non-work days bedtime is typically 9. They are typically getting out of bed at 8.      She has less frequent sleep onset (3-4/month), maintenance difficulties (1-2/month)    Patient is taking drug holidays on weekends    Patient birth control is tubal ligation       Total score - [place]: 16   LAZARUS Total Score: 21    Patient not checking BP on own.  In clinic  BP Readings from Last 6 Encounters:   02/07/24 (!) 150/96   12/02/23 (!) 178/102   08/02/23 (!) 158/90   08/01/22 133/86   02/09/22 (!) 150/93   04/23/19 (!) 148/103     Recent Labs   Lab Test 12/02/23  1455 06/07/18  1122    136   POTASSIUM 4.2 3.5   CHLORIDE 106 100   CO2 19* 21*   ANIONGAP 13 15   GLC 91 109   BUN 18.9 13   CR 0.85 1.01   CARLITOS 9.3 10.5           Past medical/surgical history, family history, social history, medications and allergies were reviewed.      Problem List:  Patient Active Problem List    Diagnosis Date Noted    Controlled substance agreement signed 02/09/2022     Priority: High     Patient is followed by CLAUDIA RINCON for ongoing prescription of stimulants.  All refills should be approved by this provider, or covering partner.    Medication(s): Adderall 20/ Adderall XR 30.   Maximum quantity per month: 90/60  Clinic visit frequency required: Q 6  months     Controlled substance agreement on file: Yes       Date(s): 8/2/2023       Last [facility] website verification:  8/1/2023   [email or web address]          Panic disorder without agoraphobia 02/12/2023     Priority: Medium    High triglycerides 07/07/2022     Priority: Medium    BLAKE (obstructive sleep apnea)- mild (AHI 12) 04/30/2021     Priority: Medium      "4/27/2021 [place] [facility] Study (195.0 lbs) - scored using 3% rules AHI 12.9, RDI 18.5, Supine AHI 12.0, REM AHI 6.7, Low O2 88.0%, Time Spent ?88% 0 minutes / Time Spent ?89% 0 minutes.      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 PSG 6/14/04 and it showed a normal AHI (no BLAKE).  MSLT 6/15/04  showed a mean latency of 6.4 minutes and 4 SOREMs        Essential hypertension 10/13/2017     Priority: Medium    History of alcohol abuse 02/19/2014     Priority: Medium     S/p treatment about 2010      Moderate episode of recurrent major depressive disorder (H) 02/19/2014     Priority: Medium    Migraine with aura 02/27/2008     Priority: Medium    Bilateral kidney stones 06/15/2023     Priority: Low    Stress incontinence in female 02/12/2023     Priority: Low    Overweight/obesity 04/26/2021     Priority: Low    Chronic insomnia 07/23/2020     Priority: Low    Irritable bowel syndrome 01/24/2004     Priority: Low        BP (!) 150/96   Pulse 59   Ht 1.676 m (5' 5.98\")   Wt 87.3 kg (192 lb 6.4 oz)   SpO2 99%   BMI 31.07 kg/m      Impression/Plan:     Narcolepsy without cataplexy  Morning inertia  - Continue Adderall XR 30 BID (60/month)  - Continue Adderall 10mg 2-3 tablets twice daily as needed (180/month)  -  Trial of lightbox. See patient instructions    - 3 months refills done     Mild Sleep apnea.  - Referred for mandibular advancement x2 in past year. Needs to have her crowns worked on first     Sleep onset, maintenance difficulties   Suspect chronic psychophysiologic insomnia. Sleep disturbance due to narcoplepsy may be suspected as well. Long-acting stimulant use also implicated   - Quiescent      Obesity  Weight stable since 2021  - See patient instructions         Ahmet to follow up with Primary Care provider regarding elevated blood pressure.   Consider " hyperaldosteronism with history of low potassium problem           Maddi Kim will follow up in about 6 month(s).       I spent 20 minutes with patient including counseling, and 15 minutes with chart review, and documentation

## 2024-02-07 NOTE — NURSING NOTE
"Chief Complaint   Patient presents with    Medication Follow-up       Initial BP (!) 150/96   Pulse 59   Ht 1.676 m (5' 5.98\")   Wt 87.3 kg (192 lb 6.4 oz)   SpO2 99%   BMI 31.07 kg/m   Estimated body mass index is 31.07 kg/m  as calculated from the following:    Height as of this encounter: 1.676 m (5' 5.98\").    Weight as of this encounter: 87.3 kg (192 lb 6.4 oz).    Medication Reconciliation: complete    Neck circumference: 13.8 inches / 35 centimeters.    Tucker Jones CMA on 2/7/2024 at 2:10 PM   "

## 2024-06-21 ENCOUNTER — TELEPHONE (OUTPATIENT)
Dept: SLEEP MEDICINE | Facility: CLINIC | Age: 49
End: 2024-06-21
Payer: COMMERCIAL

## 2024-06-21 NOTE — TELEPHONE ENCOUNTER
Pt was called to reschedule appt on 8/15 with Dr Melgoza.Gave them next available in person appt and LVM for them to call back if that doesn't work.     Sobia PALMER    Kellie Denis

## 2024-07-01 ENCOUNTER — MYC REFILL (OUTPATIENT)
Dept: SLEEP MEDICINE | Facility: CLINIC | Age: 49
End: 2024-07-01
Payer: COMMERCIAL

## 2024-07-01 DIAGNOSIS — G47.419 NARCOLEPSY WITHOUT CATAPLEXY(347.00): Chronic | ICD-10-CM

## 2024-07-01 NOTE — TELEPHONE ENCOUNTER
Pending Prescriptions:                       Disp   Refills    amphetamine-dextroamphetamine (ADDERALL) *60 tab*0            Sig: Take 1-1.5 tablets (20-30 mg) by mouth 2 times           daily as needed (sleepiness)    amphetamine-dextroamphetamine (ADDERALL X*60 cap*0            Sig: Take 1 capsule (30 mg) by mouth 2 times daily    amphetamine-dextroamphetamine (ADDERALL) *60 tab*0            Sig: Take 1-1.5 tablets (20-30 mg) by mouth 2 times           daily as needed (sleepiness)    amphetamine-dextroamphetamine (ADDERALL) *60 tab*0            Sig: Take 1-1.5 tablets (20-30 mg) by mouth 2 times           daily as needed (sleepiness)    amphetamine-dextroamphetamine (ADDERALL X*60 cap*0            Sig: Take 1 capsule (30 mg) by mouth 2 times daily    amphetamine-dextroamphetamine (ADDERALL X*60 cap*0            Sig: Take 1 capsule (30 mg) by mouth 2 times daily          Last Written Prescription Date:  2/7/24  Last Fill Quantity: 60, 60   # refills: 0  Last Office Visit: 2/7/24  Future Office visit: NA      Routing refill request to provider for review/approval because:  Drug not on the [facility] or [facility] refill protocol or controlled substance

## 2024-07-02 RX ORDER — DEXTROAMPHETAMINE SACCHARATE, AMPHETAMINE ASPARTATE, DEXTROAMPHETAMINE SULFATE AND AMPHETAMINE SULFATE 5; 5; 5; 5 MG/1; MG/1; MG/1; MG/1
20-30 TABLET ORAL 2 TIMES DAILY PRN
Qty: 60 TABLET | Refills: 0 | Status: CANCELLED | OUTPATIENT
Start: 2024-07-02

## 2024-07-02 RX ORDER — DEXTROAMPHETAMINE SACCHARATE, AMPHETAMINE ASPARTATE, DEXTROAMPHETAMINE SULFATE AND AMPHETAMINE SULFATE 5; 5; 5; 5 MG/1; MG/1; MG/1; MG/1
20-30 TABLET ORAL 2 TIMES DAILY PRN
Qty: 60 TABLET | Refills: 0 | Status: CANCELLED | OUTPATIENT
Start: 2024-07-31

## 2024-07-02 RX ORDER — DEXTROAMPHETAMINE SACCHARATE, AMPHETAMINE ASPARTATE, DEXTROAMPHETAMINE SULFATE AND AMPHETAMINE SULFATE 5; 5; 5; 5 MG/1; MG/1; MG/1; MG/1
20-30 TABLET ORAL 2 TIMES DAILY PRN
Qty: 60 TABLET | Refills: 0 | Status: CANCELLED | OUTPATIENT
Start: 2024-08-30

## 2024-07-02 RX ORDER — DEXTROAMPHETAMINE SACCHARATE, AMPHETAMINE ASPARTATE MONOHYDRATE, DEXTROAMPHETAMINE SULFATE AND AMPHETAMINE SULFATE 7.5; 7.5; 7.5; 7.5 MG/1; MG/1; MG/1; MG/1
30 CAPSULE, EXTENDED RELEASE ORAL 2 TIMES DAILY
Qty: 60 CAPSULE | Refills: 0 | Status: CANCELLED | OUTPATIENT
Start: 2024-08-30

## 2024-07-02 RX ORDER — DEXTROAMPHETAMINE SACCHARATE, AMPHETAMINE ASPARTATE MONOHYDRATE, DEXTROAMPHETAMINE SULFATE AND AMPHETAMINE SULFATE 7.5; 7.5; 7.5; 7.5 MG/1; MG/1; MG/1; MG/1
30 CAPSULE, EXTENDED RELEASE ORAL 2 TIMES DAILY
Qty: 60 CAPSULE | Refills: 0 | Status: CANCELLED | OUTPATIENT
Start: 2024-07-02

## 2024-07-02 RX ORDER — DEXTROAMPHETAMINE SACCHARATE, AMPHETAMINE ASPARTATE MONOHYDRATE, DEXTROAMPHETAMINE SULFATE AND AMPHETAMINE SULFATE 7.5; 7.5; 7.5; 7.5 MG/1; MG/1; MG/1; MG/1
30 CAPSULE, EXTENDED RELEASE ORAL 2 TIMES DAILY
Qty: 60 CAPSULE | Refills: 0 | Status: CANCELLED | OUTPATIENT
Start: 2024-07-31

## 2024-07-08 DIAGNOSIS — G47.419 NARCOLEPSY WITHOUT CATAPLEXY(347.00): Chronic | ICD-10-CM

## 2024-07-08 NOTE — TELEPHONE ENCOUNTER
Medication Question or Refill    Contacts       Contact Date/Time Type Contact Phone/Fax    07/08/2024 08:21 AM CDT Phone (Incoming) Ahmet Kim (Self) [phone] (M)            What medication are you calling about (include dose and sig)?: adderall 20 mg and 30 mg    Preferred Pharmacy:   [id number] [place], [place] - [facility] [address] [facility] [address]  [address]  [facility] [address]  Phone: [phone] Fax: [phone]      Controlled Substance Agreement on file:   CSA -- Patient Level:     [Media Unavailable] Controlled Substance Agreement - Non - Opioid - Scan on 8/2/2023  3:21 PM: CSA for Sleep Medicine   [Media Unavailable] Controlled Substance Agreement - Opioid - Scan on 6/13/2018       Who prescribed the medication?: Svee    Do you need a refill? Yes    When did you use the medication last? 1 month ago    Patient offered an appointment? Yes: Pt was scheduled 8/15/24, but provider out of office so appt was rescheduled for 1/2/25 in person    Do you have any questions or concerns?  Yes: pt needs script sent to [facility] in [place] in order for medications to be covered       [facility] or would you prefer to receive a phone call?:   Patient would prefer a phone call   Okay to leave a detailed message?: Yes at Cell number on file:    Telephone Information:   Mobile [phone]

## 2024-07-08 NOTE — TELEPHONE ENCOUNTER
Pt was scheduled 8/15/24, but provider out of office so appt was rescheduled for 1/2/25 in person     Pending Prescriptions:                       Disp   Refills    amphetamine-dextroamphetamine (ADDERALL) *60 tab*0            Sig: Take 1-1.5 tablets (20-30 mg) by mouth 2 times           daily as needed (sleepiness)    amphetamine-dextroamphetamine (ADDERALL X*60 cap*0            Sig: Take 1 capsule (30 mg) by mouth 2 times daily          Last Written Prescription Date:  -filled 4/5/24  Last Fill Quantity: Adderall 20 mg #60, Adderall XR30 mg #60  # refills: 0  Last Office Visit: 2/7/24  Future Office visit:   patient had to be rescheduled-was scheduled for 8/15. Now has next available -1/2/25    Routing refill request to provider for review/approval because:  Drug not on the [facility], [facility] or [facility] refill protocol or controlled substance

## 2024-07-09 RX ORDER — DEXTROAMPHETAMINE SACCHARATE, AMPHETAMINE ASPARTATE, DEXTROAMPHETAMINE SULFATE AND AMPHETAMINE SULFATE 5; 5; 5; 5 MG/1; MG/1; MG/1; MG/1
20-30 TABLET ORAL 2 TIMES DAILY PRN
Qty: 60 TABLET | Refills: 0 | Status: SHIPPED | OUTPATIENT
Start: 2024-07-09 | End: 2024-08-19

## 2024-07-09 RX ORDER — DEXTROAMPHETAMINE SACCHARATE, AMPHETAMINE ASPARTATE MONOHYDRATE, DEXTROAMPHETAMINE SULFATE AND AMPHETAMINE SULFATE 7.5; 7.5; 7.5; 7.5 MG/1; MG/1; MG/1; MG/1
30 CAPSULE, EXTENDED RELEASE ORAL 2 TIMES DAILY
Qty: 60 CAPSULE | Refills: 0 | Status: SHIPPED | OUTPATIENT
Start: 2024-07-09 | End: 2024-08-19

## 2024-07-17 ENCOUNTER — MYC MEDICAL ADVICE (OUTPATIENT)
Dept: SLEEP MEDICINE | Facility: CLINIC | Age: 49
End: 2024-07-17
Payer: COMMERCIAL

## 2024-07-18 NOTE — TELEPHONE ENCOUNTER
Pharmacy faxing some forms that need to signed by Dr. Melgoza for patients insurance.  They will send them today.

## 2024-07-22 NOTE — TELEPHONE ENCOUNTER
Forms filled out and placed in Dr Melgoza's box for signature.    Gayle RANGEL RN  [facility]

## 2024-08-12 ENCOUNTER — MYC MEDICAL ADVICE (OUTPATIENT)
Dept: SLEEP MEDICINE | Facility: CLINIC | Age: 49
End: 2024-08-12
Payer: COMMERCIAL

## 2024-08-12 DIAGNOSIS — G47.419 NARCOLEPSY WITHOUT CATAPLEXY(347.00): Chronic | ICD-10-CM

## 2024-08-16 ENCOUNTER — TELEPHONE (OUTPATIENT)
Dept: SLEEP MEDICINE | Facility: CLINIC | Age: 49
End: 2024-08-16
Payer: COMMERCIAL

## 2024-08-16 NOTE — TELEPHONE ENCOUNTER
PA received for Adderall 20 mg tablet. Form placed in provider box    Gayle RANGEL RN  [facility]

## 2024-08-19 RX ORDER — DEXTROAMPHETAMINE SACCHARATE, AMPHETAMINE ASPARTATE, DEXTROAMPHETAMINE SULFATE AND AMPHETAMINE SULFATE 5; 5; 5; 5 MG/1; MG/1; MG/1; MG/1
20-30 TABLET ORAL 2 TIMES DAILY PRN
Qty: 60 TABLET | Refills: 0 | Status: SHIPPED | OUTPATIENT
Start: 2024-08-19

## 2024-08-19 RX ORDER — DEXTROAMPHETAMINE SACCHARATE, AMPHETAMINE ASPARTATE MONOHYDRATE, DEXTROAMPHETAMINE SULFATE AND AMPHETAMINE SULFATE 7.5; 7.5; 7.5; 7.5 MG/1; MG/1; MG/1; MG/1
30 CAPSULE, EXTENDED RELEASE ORAL 2 TIMES DAILY
Qty: 60 CAPSULE | Refills: 0 | Status: SHIPPED | OUTPATIENT
Start: 2024-08-19

## 2024-08-19 NOTE — TELEPHONE ENCOUNTER
reviewed. Last fill for dextroamp-amphetamine 20 mg tab and adderall xr 30 mg filled 4/5/24.     Both medications pended for requested pharmacy

## 2024-08-27 ENCOUNTER — TELEPHONE (OUTPATIENT)
Dept: SLEEP MEDICINE | Facility: CLINIC | Age: 49
End: 2024-08-27
Payer: COMMERCIAL

## 2024-08-27 NOTE — TELEPHONE ENCOUNTER
Prior Authorization Retail Medication Request    Medication/Dose: Amphetamine-Dextroamphetamine 30mg 24hr tablet  Diagnosis and ICD code (if different than what is on RX):    New/renewal/insurance change PA/secondary ins. PA:  Previously Tried and Failed:    Rationale:      Insurance   Primary: [facility]  Insurance ID:  [id number]    Secondary (if applicable):  Insurance ID:      Pharmacy Information (if different than what is on RX)  Name:  [facility]   Phone:  [phone]  Fax:

## 2024-08-28 NOTE — TELEPHONE ENCOUNTER
PA Initiation    Medication: ADDERALL XR 30 MG PO CP24  Insurance Company: [employer] ([facility]) - Phone [phone] Fax [phone]  Pharmacy Filling the Rx: [facility] - [address]  Filling Pharmacy Phone: [phone]  Filling Pharmacy Fax: [phone]  Start Date: 8/28/2024    PA FORM COMPLETED AND FAXED TO [facility]

## 2024-09-03 NOTE — TELEPHONE ENCOUNTER
PRIOR AUTHORIZATION DENIED    Medication: ADDERALL XR 30 MG PO CP24  Insurance Company: [employer] ([facility]) - Phone [phone] Fax [phone]  Denial Date: 8/28/2024  Denial Reason(s):         Appeal Information: N/A  Patient Notified: NO

## 2024-09-04 ENCOUNTER — DOCUMENTATION ONLY (OUTPATIENT)
Dept: SLEEP MEDICINE | Facility: CLINIC | Age: 49
End: 2024-09-04
Payer: COMMERCIAL

## 2024-09-05 ENCOUNTER — MYC MEDICAL ADVICE (OUTPATIENT)
Dept: SLEEP MEDICINE | Facility: CLINIC | Age: 49
End: 2024-09-05
Payer: COMMERCIAL

## 2024-09-06 RX ORDER — METHYLPHENIDATE HYDROCHLORIDE 36 MG/1
36 TABLET ORAL EVERY MORNING
Qty: 30 TABLET | Refills: 0 | Status: SHIPPED | OUTPATIENT
Start: 2024-09-06

## 2024-09-06 NOTE — TELEPHONE ENCOUNTER
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 She is in tears with me because she needs her medications for her disorder. I was able to get her in this Friday at 2:20 ( I filled one of your consult slots) but she is still requesting a call from you. Would you be able to give her a call back at [phone]? Thank you.   No No

## 2024-11-09 ENCOUNTER — HEALTH MAINTENANCE LETTER (OUTPATIENT)
Age: 49
End: 2024-11-09

## 2025-03-29 ENCOUNTER — HEALTH MAINTENANCE LETTER (OUTPATIENT)
Age: 50
End: 2025-03-29